# Patient Record
Sex: FEMALE | Race: OTHER
[De-identification: names, ages, dates, MRNs, and addresses within clinical notes are randomized per-mention and may not be internally consistent; named-entity substitution may affect disease eponyms.]

---

## 2017-02-12 ENCOUNTER — HOSPITAL ENCOUNTER (EMERGENCY)
Dept: HOSPITAL 62 - ER | Age: 27
Discharge: HOME | End: 2017-02-12
Payer: MEDICAID

## 2017-02-12 VITALS — SYSTOLIC BLOOD PRESSURE: 131 MMHG | DIASTOLIC BLOOD PRESSURE: 80 MMHG

## 2017-02-12 DIAGNOSIS — R51: ICD-10-CM

## 2017-02-12 DIAGNOSIS — R09.81: ICD-10-CM

## 2017-02-12 DIAGNOSIS — J06.9: Primary | ICD-10-CM

## 2017-02-12 LAB
APPEARANCE UR: CLEAR
BILIRUB UR QL STRIP: NEGATIVE
GLUCOSE UR STRIP-MCNC: NEGATIVE MG/DL
KETONES UR STRIP-MCNC: NEGATIVE MG/DL
NITRITE UR QL STRIP: NEGATIVE
PH UR STRIP: 6 [PH] (ref 5–9)
PROT UR STRIP-MCNC: 30 MG/DL
SP GR UR STRIP: 1.02
UROBILINOGEN UR-MCNC: NEGATIVE MG/DL (ref ?–2)

## 2017-02-12 PROCEDURE — 81025 URINE PREGNANCY TEST: CPT

## 2017-02-12 PROCEDURE — 99283 EMERGENCY DEPT VISIT LOW MDM: CPT

## 2017-02-12 PROCEDURE — 81001 URINALYSIS AUTO W/SCOPE: CPT

## 2017-02-12 NOTE — ER DOCUMENT REPORT
ED Medical Screen (RME)





- General


Chief Complaint: Cough


Stated Complaint: COUGH,HEADACHE,NASAL CONGESTION


Time seen by provider: 15:14


Mode of Arrival: Ambulatory


Information source: Patient


Notes: 


27-year-old female presents to ED for high blood pressure runny nose cough and 

headache and sneezing for week.  States she had a child 3 months ago and has 

not had a period since then unsure if she is pregnant or not.  States she had 

elevated blood pressure at home blood pressure is 130/84 in the emergency room.














I have greeted and performed a rapid initial assessment of this patient.  A 

comprehensive ED assessment and evaluation of the patient, analysis of test 

results and completion of medical decision making process will be conducted by 

an additional ED providers.


TRAVEL OUTSIDE OF THE [place] IN LAST 30 DAYS: No





- Related Data


Allergies/Adverse Reactions: 


 





No Known Allergies Allergy (Verified 10/08/16 09:11)


 











Past Medical History


Pulmonary Medical History: Reports: Hx Asthma





- Immunizations


Immunizations up to date: Yes


Hx Diphtheria, Pertussis, Tetanus Vaccination: Yes

## 2017-03-25 ENCOUNTER — HOSPITAL ENCOUNTER (EMERGENCY)
Dept: HOSPITAL 62 - ER | Age: 27
Discharge: HOME | End: 2017-03-25
Payer: MEDICAID

## 2017-03-25 VITALS — SYSTOLIC BLOOD PRESSURE: 133 MMHG | DIASTOLIC BLOOD PRESSURE: 87 MMHG

## 2017-03-25 DIAGNOSIS — R51: Primary | ICD-10-CM

## 2017-03-25 DIAGNOSIS — R11.0: ICD-10-CM

## 2017-03-25 DIAGNOSIS — Z3A.13: ICD-10-CM

## 2017-03-25 LAB
ALBUMIN SERPL-MCNC: 3.9 G/DL (ref 3.5–5)
ALP SERPL-CCNC: 46 U/L (ref 38–126)
ALT SERPL-CCNC: 29 U/L (ref 9–52)
ANION GAP SERPL CALC-SCNC: 11 MMOL/L (ref 5–19)
APPEARANCE UR: (no result)
AST SERPL-CCNC: 21 U/L (ref 14–36)
BASOPHILS # BLD AUTO: 0.1 10^3/UL (ref 0–0.2)
BASOPHILS NFR BLD AUTO: 0.7 % (ref 0–2)
BILIRUB DIRECT SERPL-MCNC: 0.1 MG/DL (ref 0–0.4)
BILIRUB SERPL-MCNC: 0.5 MG/DL (ref 0.2–1.3)
BILIRUB UR QL STRIP: NEGATIVE
BUN SERPL-MCNC: 10 MG/DL (ref 7–20)
CALCIUM: 10.2 MG/DL (ref 8.4–10.2)
CHLORIDE SERPL-SCNC: 103 MMOL/L (ref 98–107)
CO2 SERPL-SCNC: 26 MMOL/L (ref 22–30)
CREAT SERPL-MCNC: 0.53 MG/DL (ref 0.52–1.25)
EOSINOPHIL # BLD AUTO: 0.2 10^3/UL (ref 0–0.6)
EOSINOPHIL NFR BLD AUTO: 3.6 % (ref 0–6)
ERYTHROCYTE [DISTWIDTH] IN BLOOD BY AUTOMATED COUNT: 16.1 % (ref 11.5–14)
GLUCOSE SERPL-MCNC: 98 MG/DL (ref 75–110)
GLUCOSE UR STRIP-MCNC: NEGATIVE MG/DL
HCT VFR BLD CALC: 35.8 % (ref 36–47)
HGB BLD-MCNC: 12.2 G/DL (ref 12–15.5)
HGB HCT DIFFERENCE: 0.8
KETONES UR STRIP-MCNC: NEGATIVE MG/DL
LDH1 SERPL-CCNC: 448 U/L (ref 313–618)
LYMPHOCYTES # BLD AUTO: 2.1 10^3/UL (ref 0.5–4.7)
LYMPHOCYTES NFR BLD AUTO: 31.2 % (ref 13–45)
MCH RBC QN AUTO: 27.6 PG (ref 27–33.4)
MCHC RBC AUTO-ENTMCNC: 34 G/DL (ref 32–36)
MCV RBC AUTO: 81 FL (ref 80–97)
MONOCYTES # BLD AUTO: 0.5 10^3/UL (ref 0.1–1.4)
MONOCYTES NFR BLD AUTO: 7.6 % (ref 3–13)
NEUTROPHILS # BLD AUTO: 3.9 10^3/UL (ref 1.7–8.2)
NEUTS SEG NFR BLD AUTO: 56.9 % (ref 42–78)
NITRITE UR QL STRIP: NEGATIVE
PH UR STRIP: 6 [PH] (ref 5–9)
POTASSIUM SERPL-SCNC: 4.2 MMOL/L (ref 3.6–5)
PROT SERPL-MCNC: 7.4 G/DL (ref 6.3–8.2)
PROT UR STRIP-MCNC: 30 MG/DL
RBC # BLD AUTO: 4.41 10^6/UL (ref 3.72–5.28)
SODIUM SERPL-SCNC: 140.1 MMOL/L (ref 137–145)
SP GR UR STRIP: 1.03
URATE SERPL-MCNC: 3.8 MG/DL (ref 2.5–6.2)
UROBILINOGEN UR-MCNC: NEGATIVE MG/DL (ref ?–2)
WBC # BLD AUTO: 6.8 10^3/UL (ref 4–10.5)

## 2017-03-25 PROCEDURE — 99284 EMERGENCY DEPT VISIT MOD MDM: CPT

## 2017-03-25 PROCEDURE — 83615 LACTATE (LD) (LDH) ENZYME: CPT

## 2017-03-25 PROCEDURE — 80053 COMPREHEN METABOLIC PANEL: CPT

## 2017-03-25 PROCEDURE — 84702 CHORIONIC GONADOTROPIN TEST: CPT

## 2017-03-25 PROCEDURE — 84550 ASSAY OF BLOOD/URIC ACID: CPT

## 2017-03-25 PROCEDURE — 85025 COMPLETE CBC W/AUTO DIFF WBC: CPT

## 2017-03-25 PROCEDURE — 81001 URINALYSIS AUTO W/SCOPE: CPT

## 2017-03-25 PROCEDURE — 36415 COLL VENOUS BLD VENIPUNCTURE: CPT

## 2017-03-25 NOTE — ER DOCUMENT REPORT
ED General





- General


Chief Complaint: Headache


Stated Complaint: NAUSEA


Mode of Arrival: Ambulatory


TRAVEL OUTSIDE OF THE [place] IN LAST 30 DAYS: No





- HPI


Patient complains to provider of: frontal headache


Notes: 


Patient's coming in stating that she's not had a menstrual cycle for the last 3 

months concerned she is pregnant has a frontal headache which is similar to 

headaches in the past with no relief with Tylenol home.  Patient also is 

concerned she is pregnant and does not know how far along she is.  Patient also 

because complain of some mild nausea denies any fevers chills diarrhea 

abdominal trauma abdominal pain 





- Related Data


Allergies/Adverse Reactions: 


 





No Known Allergies Allergy (Verified 17 11:54)


 











Past Medical History





- General


Information source: Patient





- Social History


Smoking Status: Never Smoker


Chew tobacco use (# tins/day): No


Frequency of alcohol use: None


Drug Abuse: None


Family History: Reviewed & Not Pertinent


Pulmonary Medical History: Reports: Hx Asthma


Renal/ Medical History: Denies: Hx Peritoneal Dialysis





- Immunizations


Immunizations up to date: Yes


Hx Diphtheria, Pertussis, Tetanus Vaccination: Yes





Review of Systems





- Review of Systems


Constitutional: No symptoms reported


EENT: No symptoms reported


Cardiovascular: No symptoms reported


Respiratory: No symptoms reported


Gastrointestinal: Nausea


Genitourinary: No symptoms reported


Female Genitourinary: No symptoms reported


Musculoskeletal: No symptoms reported


Skin: No symptoms reported


Hematologic/Lymphatic: No symptoms reported


Neurological/Psychological: Headaches


-: Yes All other systems reviewed and negative





Physical Exam





- Vital signs


Vitals: 





 











Temp Pulse Resp BP Pulse Ox


 


 98.3 F   106 H  24 H  155/99 H  99 


 


 17 11:51  17 11:51  17 11:51  17 11:51  17 11:51











Interpretation: Normal





- General


General appearance: Appears well, Alert





- HEENT


Head: Normocephalic, Atraumatic


Eyes: Normal


Pupils: PERRL





- Respiratory


Respiratory status: No respiratory distress


Chest status: Nontender


Breath sounds: Normal


Chest palpation: Normal





- Cardiovascular


Rhythm: Regular


Heart sounds: Normal auscultation


Murmur: No





- Abdominal


Inspection: Normal


Distension: No distension


Bowel sounds: Normal


Tenderness: Nontender


Organomegaly: No organomegaly





- Back


Back: Normal, Nontender





- Extremities


General upper extremity: Normal inspection, Nontender, Normal color, Normal ROM

, Normal temperature


General lower extremity: Normal inspection, Nontender, Normal color, Normal ROM

, Normal temperature, Normal weight bearing.  No: Elaine's sign





- Neurological


Neuro grossly intact: Yes


Cognition: Normal


Orientation: AAOx4


[place] Coma Scale Eye Opening: Spontaneous


Lance Coma Scale Verbal: Oriented


[place] Coma Scale Motor: Obeys Commands


[place] Coma Scale Total: 15


Speech: Normal


Motor strength normal: LUE, RUE, LLE, RLE


Sensory: Normal





- Psychological


Associated symptoms: Normal affect, Normal mood





- Skin


Skin Temperature: Warm


Skin Moisture: Dry


Skin Color: Normal





Course





- Re-evaluation


Re-evalutation: 





17 13:38


Patient laboratory showed elevation in the patient's beta-hCG.  Bedside 

ultrasound does confirm a IUP with a biparietal measurement of approximately 13 

weeks.  Patient's fetal heart tones was measured at 160-158.  Otherwise no 

other signs of concerning etiology.  Patient was encouraged to take Reglan for 

for nausea and prenatal vitamins.  Patient was encouraged follow-up with her OB/

GYN.  Patient will be discharged home





- Vital Signs


Vital signs: 





 











Temp Pulse Resp BP Pulse Ox


 


 98.3 F   106 H  24 H  155/99 H  99 


 


 17 11:51  17 11:51  17 11:51  17 11:51  17 11:51














- Laboratory


Result Diagrams: 


 17 12:00





 17 12:00


Laboratory results interpreted by me: 





 











  17





  12:00 12:00 12:05


 


Hct  35.8 L  


 


RDW  16.1 H  


 


Beta HCG, Quant   06618.00 H 


 


Urine Protein    30 H


 


Ur Leukocyte Esterase    SMALL H


 


Urine Ascorbic Acid    40 H














Discharge





- Discharge


Clinical Impression: 


 Nausea





Pregnancy


Qualifiers:


 Weeks of gestation: 13 weeks Qualified Code(s): Z3A.13 - 13 weeks gestation of 

pregnancy





Headache


Qualifiers:


 Headache type: unspecified Headache chronicity pattern: unspecified pattern 

Intractability: not intractable Qualified Code(s): R51 - Headache





Condition: Good


Disposition: HOME, SELF-CARE


Instructions:  Headache ([facility]), Reglan ([facility]), Pregnancy ([facility])


Additional Instructions: 


Take medication as prescribed.  Please continue to take your prenatal vitamins.





You have been seen for vomiting during pregnancy.  You should continue to drink 

plenty of water and consider taking a solution such as Pedialyte if your having 

difficulty eating food.  Please return if you become unable to drink any fluids 

for more than 12 hours, urinate less than twice a day, pass out, or have any 

other symptoms that are concerning to you.





For nausea and vomiting during pregnancy I recomment:


Start with 10-12.5 mg of pyridoxine (vitamin B6) three times a day for 2 days. 

If not fully effective,


Increase to 12.5 mg of pyridoxine four times a day for 2 days. If not fully 

effective,


Increase to 25 mg of pyridoxine three times a day for 2 days. If not fully 

effective,


Continue 25 mg pyridoxine 3 times a day, and add 12.5 mg of doxylamine before 

bedtime each day for 2 days. If not fully effective,


Continue 25 mg pyridoxine 3 times a day, and take 12.5 mg of doxylamine twice a 

day. If not fully effective,


Continue 25 mg pyridoxine 3 times a day, and take 12.5 mg of doxylamine three 

times a day. If not fully effective,


Continue 25 mg pyridoxine 3 times a day, and 12.5 mg of doxylamine 3 times a day

, while adding Emetrol, one to two tablespoons (15-30 cc) taken once or twice a 

day as needed. (Emetrol is an over-the-counter mixture of sugar syrups and 

phosphoric acid [phosphorylated carbohydrate solution]) that acts by soothing 

the actual wall of the gastrointestinal tract). If not fully effective,


Consult with your doctor.


Prescriptions: 


Metoclopramide HCl [Reglan] 5 mg PO Q6 #20 tablet


Pnv No.122/Iron/Folic Acid [Prenatal Multi Tablet] 1 each PO DAILY #30 tablet


Referrals: 


KEYSHAWN MALIK MD [Primary Care Provider] - Follow up in 3-5 days

## 2017-03-25 NOTE — ER DOCUMENT REPORT
ED Medical Screen (RME)





- General


Stated Complaint: NAUSEA


Mode of Arrival: Ambulatory


Information source: Patient


Notes: 


pt presents with HA for the past 2 days.  Patient reports she is pregnant.  She 

reports with last pregnancy she was preeclamptic.  She also reports nausea and 

vomiting.  LMP January. 











I have greeted and performed a rapid initial assessment of this patient.  A 

comprehensive ED assessment and evaluation of the patient, analysis of test 

results and completion of the medical decision making process will be conducted 

by additional ED providers.


TRAVEL OUTSIDE OF THE [place] IN LAST 30 DAYS: No





- Related Data


Allergies/Adverse Reactions: 


 





No Known Allergies Allergy (Verified 17 15:18)


 











Past Medical History


Pulmonary Medical History: Reports: Hx Asthma


Renal/ Medical History: Denies: Hx Peritoneal Dialysis





- Immunizations


Immunizations up to date: Yes


Hx Diphtheria, Pertussis, Tetanus Vaccination: Yes

## 2017-04-11 ENCOUNTER — HOSPITAL ENCOUNTER (EMERGENCY)
Dept: HOSPITAL 62 - ER | Age: 27
Discharge: HOME | End: 2017-04-11
Payer: SELF-PAY

## 2017-04-11 VITALS — SYSTOLIC BLOOD PRESSURE: 130 MMHG | DIASTOLIC BLOOD PRESSURE: 95 MMHG

## 2017-04-11 DIAGNOSIS — R51: ICD-10-CM

## 2017-04-11 DIAGNOSIS — Z3A.16: ICD-10-CM

## 2017-04-11 DIAGNOSIS — R42: ICD-10-CM

## 2017-04-11 DIAGNOSIS — R03.0: Primary | ICD-10-CM

## 2017-04-11 LAB
ALBUMIN SERPL-MCNC: 4.2 G/DL (ref 3.5–5)
ALP SERPL-CCNC: 52 U/L (ref 38–126)
ALT SERPL-CCNC: 29 U/L (ref 9–52)
ANION GAP SERPL CALC-SCNC: 14 MMOL/L (ref 5–19)
APPEARANCE UR: (no result)
AST SERPL-CCNC: 28 U/L (ref 14–36)
BASOPHILS # BLD AUTO: 0 10^3/UL (ref 0–0.2)
BASOPHILS NFR BLD AUTO: 0.4 % (ref 0–2)
BILIRUB DIRECT SERPL-MCNC: 0.2 MG/DL (ref 0–0.4)
BILIRUB SERPL-MCNC: 0.5 MG/DL (ref 0.2–1.3)
BILIRUB UR QL STRIP: NEGATIVE
BUN SERPL-MCNC: 13 MG/DL (ref 7–20)
CALCIUM: 10.1 MG/DL (ref 8.4–10.2)
CHLORIDE SERPL-SCNC: 103 MMOL/L (ref 98–107)
CO2 SERPL-SCNC: 24 MMOL/L (ref 22–30)
CREAT SERPL-MCNC: 0.52 MG/DL (ref 0.52–1.25)
EOSINOPHIL # BLD AUTO: 0.5 10^3/UL (ref 0–0.6)
EOSINOPHIL NFR BLD AUTO: 6.2 % (ref 0–6)
ERYTHROCYTE [DISTWIDTH] IN BLOOD BY AUTOMATED COUNT: 15.9 % (ref 11.5–14)
GLUCOSE SERPL-MCNC: 90 MG/DL (ref 75–110)
GLUCOSE UR STRIP-MCNC: NEGATIVE MG/DL
HCT VFR BLD CALC: 36.7 % (ref 36–47)
HGB BLD-MCNC: 12.3 G/DL (ref 12–15.5)
HGB HCT DIFFERENCE: 0.2
KETONES UR STRIP-MCNC: NEGATIVE MG/DL
LYMPHOCYTES # BLD AUTO: 2.5 10^3/UL (ref 0.5–4.7)
LYMPHOCYTES NFR BLD AUTO: 28.3 % (ref 13–45)
MCH RBC QN AUTO: 27.5 PG (ref 27–33.4)
MCHC RBC AUTO-ENTMCNC: 33.4 G/DL (ref 32–36)
MCV RBC AUTO: 82 FL (ref 80–97)
MONOCYTES # BLD AUTO: 0.7 10^3/UL (ref 0.1–1.4)
MONOCYTES NFR BLD AUTO: 8.3 % (ref 3–13)
NEUTROPHILS # BLD AUTO: 5 10^3/UL (ref 1.7–8.2)
NEUTS SEG NFR BLD AUTO: 56.8 % (ref 42–78)
NITRITE UR QL STRIP: NEGATIVE
PH UR STRIP: 6 [PH] (ref 5–9)
POTASSIUM SERPL-SCNC: 4.5 MMOL/L (ref 3.6–5)
PROT SERPL-MCNC: 7.8 G/DL (ref 6.3–8.2)
PROT UR STRIP-MCNC: 30 MG/DL
RBC # BLD AUTO: 4.46 10^6/UL (ref 3.72–5.28)
SODIUM SERPL-SCNC: 140.5 MMOL/L (ref 137–145)
SP GR UR STRIP: 1.03
UROBILINOGEN UR-MCNC: NEGATIVE MG/DL (ref ?–2)
WBC # BLD AUTO: 8.8 10^3/UL (ref 4–10.5)

## 2017-04-11 PROCEDURE — 85025 COMPLETE CBC W/AUTO DIFF WBC: CPT

## 2017-04-11 PROCEDURE — 36415 COLL VENOUS BLD VENIPUNCTURE: CPT

## 2017-04-11 PROCEDURE — 99284 EMERGENCY DEPT VISIT MOD MDM: CPT

## 2017-04-11 PROCEDURE — 80053 COMPREHEN METABOLIC PANEL: CPT

## 2017-04-11 PROCEDURE — 81001 URINALYSIS AUTO W/SCOPE: CPT

## 2017-04-11 NOTE — ER DOCUMENT REPORT
ED Medical Screen (RME)





- General


Chief Complaint: High Blood Pressure


Stated Complaint: HEADACHE,LIGHTHEADED


Notes: 


This 27-year-old female who is 16 weeks pregnant is sent from the health 

department for monitoring for elevated blood pressure.


She did have preeclampsia with her first pregnancy and was delivered at 29 

weeks.  Her most recent pregnancy her blood pressure was quite elevated, but 

she reports that they never started treatment during that pregnancy.


She has no symptoms associated with this pregnancy other than occasional nausea.





I have greeted and performed a rapid initial assessment of this patient.  A 

comprehensive ED assessment and evaluation of the patient, analysis of test 

results and completion of the medical decision making process will be conducted 

by additional ED providers.


TRAVEL OUTSIDE OF THE [place] IN LAST 30 DAYS: No





- Related Data


Allergies/Adverse Reactions: 


 





No Known Allergies Allergy (Verified 04/11/17 17:29)


 











Past Medical History


Pulmonary Medical History: Reports: Hx Asthma


Renal/ Medical History: Denies: Hx Peritoneal Dialysis





- Immunizations


Immunizations up to date: Yes


Hx Diphtheria, Pertussis, Tetanus Vaccination: Yes





Physical Exam





- Vital signs


Vitals: 





 











Temp Pulse Resp BP Pulse Ox


 


 98.3 F   95   20   147/102 H  100 


 


 04/11/17 17:32  04/11/17 17:32  04/11/17 17:32  04/11/17 17:32  04/11/17 17:32














Course





- Vital Signs


Vital signs: 





 











Temp Pulse Resp BP Pulse Ox


 


 98.3 F   95   20   147/102 H  100 


 


 04/11/17 17:32  04/11/17 17:32  04/11/17 17:32  04/11/17 17:32  04/11/17 17:32

## 2017-04-11 NOTE — ER DOCUMENT REPORT
ED General





- General


Chief Complaint: High Blood Pressure


Stated Complaint: HEADACHE,LIGHTHEADED


Notes: 


Patient is a 27-year-old female,  at 16 weeks gestation by first trimester 

ultrasound, that comes emergency department for chief complaint of being sent 

from the health department for elevated blood pressures.  Patient denies any 

current symptoms, states she feels fine now, states she did have a headache 

earlier but this resolved.  She denies vomiting, abdominal pain, vaginal 

bleeding, or any other current symptoms.  He states she has a history of 

preeclampsia with her second pregnancy, states she was induced at 29 weeks. 


TRAVEL OUTSIDE OF THE [place] IN LAST 30 DAYS: No





- Related Data


Allergies/Adverse Reactions: 


 





No Known Allergies Allergy (Verified 17 17:29)


 











Past Medical History





- General


Information source: Patient





- Social History


Smoking Status: Never Smoker


Frequency of alcohol use: None


Drug Abuse: None


Lives with: Family


Family History: Reviewed & Not Pertinent


Patient has suicidal ideation: No


Patient has homicidal ideation: No


Pulmonary Medical History: Reports: Hx Asthma


Renal/ Medical History: Denies: Hx Peritoneal Dialysis





- Immunizations


Immunizations up to date: Yes


Hx Diphtheria, Pertussis, Tetanus Vaccination: Yes





Review of Systems





- Review of Systems


Constitutional: No symptoms reported


EENT: No symptoms reported


Cardiovascular: See HPI


Respiratory: No symptoms reported


Gastrointestinal: No symptoms reported


Genitourinary: No symptoms reported


Female Genitourinary: See HPI


Musculoskeletal: No symptoms reported


Skin: No symptoms reported


Hematologic/Lymphatic: No symptoms reported


Neurological/Psychological: No symptoms reported





Physical Exam





- Vital signs


Vitals: 


 











Temp Pulse Resp BP Pulse Ox


 


 98.3 F   95   20   147/102 H  100 


 


 17 17:32  17 17:32  17 17:32  17 17:32  17 17:32











Interpretation: Normal





- General


General appearance: Appears well, Alert, Other - Patient appears excited, talks 

rapidly, appears slightly nervous


In distress: None





- HEENT


Head: Normocephalic, Atraumatic


Eyes: Normal


Pupils: PERRL





- Respiratory


Respiratory status: No respiratory distress


Chest status: Nontender


Breath sounds: Normal


Chest palpation: Normal





- Cardiovascular


Rhythm: Regular.  No: Tachycardia


Heart sounds: Normal auscultation, S1 appreciated, S2 appreciated


Murmur: No





- Abdominal


Inspection: Normal


Distension: No distension


Bowel sounds: Normal


Tenderness: Nontender.  No: Tender


Organomegaly: No organomegaly





- Back


Back: Normal, Nontender





- Extremities


General upper extremity: Normal inspection, Nontender, Normal color, Normal ROM

, Normal temperature


General lower extremity: Normal inspection, Nontender, Normal color, Normal ROM

, Normal temperature, Normal weight bearing.  No: Elaine's sign





- Neurological


Neuro grossly intact: Yes


Cognition: Normal


Orientation: AAOx4


[place] Coma Scale Eye Opening: Spontaneous


[place] Coma Scale Verbal: Oriented


[place] Coma Scale Motor: Obeys Commands


Lance Coma Scale Total: 15


Speech: Normal


Motor strength normal: LUE, RUE, LLE, RLE


Sensory: Normal





- Psychological


Associated symptoms: Normal affect, Normal mood, Anxious - Slightly





- Skin


Skin Temperature: Warm


Skin Moisture: Dry


Skin Color: Normal





Course





- Re-evaluation


Re-evalutation: 


Patient initially very nervous and excited, blood pressure slightly elevated at 

nonspecific, this was repeated when she was calmer and is in the 130s over 80s, 

patient very well appearing, soft abdomen, unremarkable vital signs, 

nonspecific workup which is basically unchanged from the previous 2 workups 

that she has had during this pregnancy at this department.  





Patient is less than 20 weeks gestation, per up-to-date guidelines they do not 

recommend treatment of elevated blood pressure that is averaging below 150s 

over 100s even in patients greater than 20 weeks who have preeclampsia.  I 

discussed this with patient.  I discussed her workup, patient has follow-up are 

scheduled for OB/GYN, discussed return precautions.  Patient states 

understanding, satisfaction, agreement.





- Vital Signs


Vital signs: 


 











Temp Pulse Resp BP Pulse Ox


 


 99.1 F   103 H  16   130/95 H  98 


 


 17 20:45  17 20:45  17 20:45  17 20:45  17 20:45














- Laboratory


Result Diagrams: 


 17 18:05





 17 18:05


Laboratory results interpreted by me: 


 











  17





  18:05 18:10


 


RDW  15.9 H 


 


Eosinophils %  6.2 H 


 


Urine Protein   30 H














Discharge





- Discharge


Clinical Impression: 


 Elevated blood pressure reading





Condition: Stable


Disposition: HOME, SELF-CARE


Additional Instructions: 


Your blood pressure is borderline high, I suspect this is not specific to 

pregnancy and you likely have  pre-existing borderline hypertension.  This 

needs to be monitored routinely by primary care/OB/GYN.  Follow-up with them 

for additional management. However please return immediately if she develops 

any concerning symptoms including severe headache, severe abdominal pain, 

vaginal bleeding, or any other concerning symptoms.

## 2017-04-25 ENCOUNTER — HOSPITAL ENCOUNTER (EMERGENCY)
Dept: HOSPITAL 62 - ER | Age: 27
LOS: 1 days | Discharge: LEFT BEFORE DISCHARGE | End: 2017-04-26
Payer: SELF-PAY

## 2017-04-25 VITALS — SYSTOLIC BLOOD PRESSURE: 128 MMHG | DIASTOLIC BLOOD PRESSURE: 88 MMHG

## 2017-04-25 DIAGNOSIS — Z53.21: Primary | ICD-10-CM

## 2017-05-31 ENCOUNTER — HOSPITAL ENCOUNTER (OUTPATIENT)
Dept: HOSPITAL 62 - RAD | Age: 27
End: 2017-05-31
Attending: NURSE PRACTITIONER
Payer: MEDICAID

## 2017-05-31 DIAGNOSIS — Z34.82: Primary | ICD-10-CM

## 2017-05-31 PROCEDURE — 76805 OB US >/= 14 WKS SNGL FETUS: CPT

## 2017-05-31 NOTE — RADIOLOGY REPORT (SQ)
EXAM DESCRIPTION:  U/S OB 14+ TRNABD 1GES W/O DOP



COMPLETED DATE/TIME:  5/31/2017 4:28 pm



REASON FOR STUDY:  PREGNANCY SECOND TRIMESTER Z34.82  ENCOUNTER FOR SUPRVSN OF NORMAL PREGNANCY, SECO
ND TRI



COMPARISON:  None.



TECHNIQUE:  Static and Dynamic grayscale imaging performed of gravid uterus using transabdominal appr
oach.  Additional selected color Doppler and spectral images recorded.  All stored on PACS.



LIMITATIONS:  None.



FINDINGS:  EGA: 22 week 4 day.

ADEN: 9/30/2017.

EFW: 508 g.

PERCENTILE: 24%.

ORTIZ: Adequate amount.

PLACENTA: Anterior.

FETAL PRESENTATION: Breech.

FETAL ANATOMY:

FETAL HEART RATE: 173 beats per minute.

FOUR CHAMBER HEART: Visualized.

THREE VESSEL CORD: Yes.

CORD INSERTION: Visualized.

KIDNEYS AND BLADDER: Visualized. Appear normal.

STOMACH: Visualized. Appears normal.

SPINE: Normal as visualized.

BRAIN AND LATERAL VENTRICLES: Visualized. Appear normal.

OTHER: No other significant finding.

MATERNAL ADNEXA: Maternal ovaries not visualized.

OTHER: No other significant finding.



IMPRESSION:  LIVING INTRAUTERINE PREGNANCY.

ESTIMATED GESTATIONAL AGE 22 WEEK 4 DAY.

NO VISUALIZED ANOMALIES.

Trimester of pregnancy: Second trimester - 13 weeks 1 day to 27 weeks 6 days.



TECHNICAL DOCUMENTATION:  JOB ID:  [id number]

 2011 [email or web address]- All Rights Reserved

## 2017-07-09 ENCOUNTER — HOSPITAL ENCOUNTER (OUTPATIENT)
Dept: HOSPITAL 62 - LC | Age: 27
Setting detail: OBSERVATION
LOS: 1 days | Discharge: HOME | End: 2017-07-10
Attending: OBSTETRICS & GYNECOLOGY | Admitting: OBSTETRICS & GYNECOLOGY
Payer: MEDICAID

## 2017-07-09 DIAGNOSIS — O98.413: ICD-10-CM

## 2017-07-09 DIAGNOSIS — E66.01: ICD-10-CM

## 2017-07-09 DIAGNOSIS — Z86.59: ICD-10-CM

## 2017-07-09 DIAGNOSIS — B19.20: ICD-10-CM

## 2017-07-09 DIAGNOSIS — B18.2: ICD-10-CM

## 2017-07-09 DIAGNOSIS — R00.0: ICD-10-CM

## 2017-07-09 DIAGNOSIS — E83.42: ICD-10-CM

## 2017-07-09 DIAGNOSIS — Z82.49: ICD-10-CM

## 2017-07-09 DIAGNOSIS — O26.893: ICD-10-CM

## 2017-07-09 DIAGNOSIS — O13.3: Primary | ICD-10-CM

## 2017-07-09 DIAGNOSIS — O09.33: ICD-10-CM

## 2017-07-09 DIAGNOSIS — O99.213: ICD-10-CM

## 2017-07-09 DIAGNOSIS — I08.1: ICD-10-CM

## 2017-07-09 DIAGNOSIS — Z3A.28: ICD-10-CM

## 2017-07-09 DIAGNOSIS — Z87.09: ICD-10-CM

## 2017-07-09 LAB
ALBUMIN SERPL-MCNC: 3.5 G/DL (ref 3.5–5)
ALP SERPL-CCNC: 67 U/L (ref 38–126)
ALT SERPL-CCNC: 29 U/L (ref 9–52)
ANION GAP SERPL CALC-SCNC: 11 MMOL/L (ref 5–19)
APPEARANCE UR: (no result)
AST SERPL-CCNC: 23 U/L (ref 14–36)
BARBITURATES UR QL SCN: NEGATIVE
BASOPHILS # BLD AUTO: 0.1 10^3/UL (ref 0–0.2)
BASOPHILS NFR BLD AUTO: 0.6 % (ref 0–2)
BILIRUB DIRECT SERPL-MCNC: 0.3 MG/DL (ref 0–0.4)
BILIRUB SERPL-MCNC: 0.4 MG/DL (ref 0.2–1.3)
BILIRUB UR QL STRIP: NEGATIVE
BUN SERPL-MCNC: 9 MG/DL (ref 7–20)
CALCIUM: 9.7 MG/DL (ref 8.4–10.2)
CHLORIDE SERPL-SCNC: 107 MMOL/L (ref 98–107)
CO2 SERPL-SCNC: 21 MMOL/L (ref 22–30)
CREAT SERPL-MCNC: 0.42 MG/DL (ref 0.52–1.25)
CREAT UR-MCNC: 106.6 MG/DL (ref 16–327)
EOSINOPHIL # BLD AUTO: 0.2 10^3/UL (ref 0–0.6)
EOSINOPHIL NFR BLD AUTO: 1.9 % (ref 0–6)
ERYTHROCYTE [DISTWIDTH] IN BLOOD BY AUTOMATED COUNT: 14.8 % (ref 11.5–14)
GLUCOSE SERPL-MCNC: 90 MG/DL (ref 75–110)
GLUCOSE UR STRIP-MCNC: NEGATIVE MG/DL
HCT VFR BLD CALC: 36.2 % (ref 36–47)
HGB BLD-MCNC: 12.1 G/DL (ref 12–15.5)
HGB HCT DIFFERENCE: 0.1
KETONES UR STRIP-MCNC: NEGATIVE MG/DL
LDH1 SERPL-CCNC: 471 U/L (ref 313–618)
LYMPHOCYTES # BLD AUTO: 2.5 10^3/UL (ref 0.5–4.7)
LYMPHOCYTES NFR BLD AUTO: 26.7 % (ref 13–45)
MCH RBC QN AUTO: 28.2 PG (ref 27–33.4)
MCHC RBC AUTO-ENTMCNC: 33.5 G/DL (ref 32–36)
MCV RBC AUTO: 84 FL (ref 80–97)
METHADONE UR QL SCN: NEGATIVE
MONOCYTES # BLD AUTO: 0.8 10^3/UL (ref 0.1–1.4)
MONOCYTES NFR BLD AUTO: 8.4 % (ref 3–13)
NEUTROPHILS # BLD AUTO: 5.9 10^3/UL (ref 1.7–8.2)
NEUTS SEG NFR BLD AUTO: 62.4 % (ref 42–78)
NITRITE UR QL STRIP: NEGATIVE
PCP UR QL SCN: NEGATIVE
PH UR STRIP: 6 [PH] (ref 5–9)
POTASSIUM SERPL-SCNC: 4.8 MMOL/L (ref 3.6–5)
PROT SERPL-MCNC: 7.2 G/DL (ref 6.3–8.2)
PROT UR STRIP-MCNC: 37.6 MG/DL (ref ?–12)
PROT UR STRIP-MCNC: NEGATIVE MG/DL
RBC # BLD AUTO: 4.3 10^6/UL (ref 3.72–5.28)
SODIUM SERPL-SCNC: 138.5 MMOL/L (ref 137–145)
SP GR UR STRIP: 1.02
URATE SERPL-MCNC: 3.6 MG/DL (ref 2.5–6.2)
URINE OPIATES LOW: NEGATIVE
UROBILINOGEN UR-MCNC: NEGATIVE MG/DL (ref ?–2)
WBC # BLD AUTO: 9.5 10^3/UL (ref 4–10.5)

## 2017-07-09 PROCEDURE — 86850 RBC ANTIBODY SCREEN: CPT

## 2017-07-09 PROCEDURE — 84439 ASSAY OF FREE THYROXINE: CPT

## 2017-07-09 PROCEDURE — 93005 ELECTROCARDIOGRAM TRACING: CPT

## 2017-07-09 PROCEDURE — 84443 ASSAY THYROID STIM HORMONE: CPT

## 2017-07-09 PROCEDURE — 85025 COMPLETE CBC W/AUTO DIFF WBC: CPT

## 2017-07-09 PROCEDURE — 84550 ASSAY OF BLOOD/URIC ACID: CPT

## 2017-07-09 PROCEDURE — 94760 N-INVAS EAR/PLS OXIMETRY 1: CPT

## 2017-07-09 PROCEDURE — 83735 ASSAY OF MAGNESIUM: CPT

## 2017-07-09 PROCEDURE — 93306 TTE W/DOPPLER COMPLETE: CPT

## 2017-07-09 PROCEDURE — 36415 COLL VENOUS BLD VENIPUNCTURE: CPT

## 2017-07-09 PROCEDURE — G0378 HOSPITAL OBSERVATION PER HR: HCPCS

## 2017-07-09 PROCEDURE — 82570 ASSAY OF URINE CREATININE: CPT

## 2017-07-09 PROCEDURE — 93010 ELECTROCARDIOGRAM REPORT: CPT

## 2017-07-09 PROCEDURE — 83615 LACTATE (LD) (LDH) ENZYME: CPT

## 2017-07-09 PROCEDURE — 81001 URINALYSIS AUTO W/SCOPE: CPT

## 2017-07-09 PROCEDURE — 86901 BLOOD TYPING SEROLOGIC RH(D): CPT

## 2017-07-09 PROCEDURE — 84481 FREE ASSAY (FT-3): CPT

## 2017-07-09 PROCEDURE — 86900 BLOOD TYPING SEROLOGIC ABO: CPT

## 2017-07-09 PROCEDURE — G0379 DIRECT REFER HOSPITAL OBSERV: HCPCS

## 2017-07-09 PROCEDURE — 84156 ASSAY OF PROTEIN URINE: CPT

## 2017-07-09 PROCEDURE — 80307 DRUG TEST PRSMV CHEM ANLYZR: CPT

## 2017-07-09 PROCEDURE — 80053 COMPREHEN METABOLIC PANEL: CPT

## 2017-07-09 PROCEDURE — 76815 OB US LIMITED FETUS(S): CPT

## 2017-07-09 RX ADMIN — BETAMETHASONE SODIUM PHOSPHATE AND BETAMETHASONE ACETATE SCH MG: 3; 3 INJECTION, SUSPENSION INTRA-ARTICULAR; INTRALESIONAL; INTRAMUSCULAR at 16:11

## 2017-07-09 NOTE — RADIOLOGY REPORT (SQ)
EXAM DESCRIPTION:  U/S OB LIMITED



COMPLETED DATE/TIME:  7/9/2017 5:38 pm



REASON FOR STUDY:  29wk IUP, poor dates, HTN, GROWTH



COMPARISON:  None.



TECHNIQUE:  Limited transabdominal grayscale ultrasound for evaluation of specific requested obstetri
parker parameters.



LIMITATIONS:  None.



FINDINGS:  Intrauterine pregnancy measuring 29 weeks 2 days by composite ultrasound scoring, consiste
nt with dates.  EFW 1394 g. CERVICAL LENGTH:  Not visualized

ORTIZ: 15.2 cm.

FHR: Not documented.

PRESENTATION: Cephalic.

OTHER: Anterior placenta.



IMPRESSION:  LIMITED OBSTETRICAL ULTRASOUND WITH MEASURED PARAMETERS DELINEATED ABOVE.

Trimester of pregnancy: Third trimester - 28 weeks to delivery.



TECHNICAL DOCUMENTATION:  JOB ID:  [id number]

 2011 [email or web address]- All Rights Reserved

## 2017-07-10 VITALS — DIASTOLIC BLOOD PRESSURE: 87 MMHG | SYSTOLIC BLOOD PRESSURE: 134 MMHG

## 2017-07-10 LAB
PROT UR STRIP-MCNC: 10 MG/DL (ref ?–12)
T3FREE SERPL-MCNC: 2.4 PG/ML (ref 2.77–5.27)
TSH SERPL-ACNC: 0.59 UIU/ML (ref 0.47–4.68)

## 2017-07-10 RX ADMIN — BETAMETHASONE SODIUM PHOSPHATE AND BETAMETHASONE ACETATE SCH MG: 3; 3 INJECTION, SUSPENSION INTRA-ARTICULAR; INTRALESIONAL; INTRAMUSCULAR at 17:06

## 2017-07-10 NOTE — PDOC PROGRESS REPORT
Subjective


Subjective:: 


Pt reports feeling well, onlt occ "heart palpatations" when she gets nervous, 

no pain or SOB


No ctx's, VB or LOF, reports good fetal movement





Physical Exam





- Physical Exam


Vital Signs: 


 











Temp Pulse Resp BP Pulse Ox


 


 97.7 F   112 H  16   125/74   98 


 


 07/10/17 08:04  07/10/17 08:04  07/10/17 08:04  07/10/17 08:04  07/10/17 08:04








 Intake & Output











 07/09/17 07/10/17 07/11/17





 06:59 06:59 06:59


 


Weight  111.35 kg 











General appearance: PRESENT: no acute distress, well-developed





Result


Laboratory Results: 


 





 17 16:03 





 17 15:15 





 











  17





  14:33 15:15 15:15


 


WBC   


 


RBC   


 


Hgb   


 


Hct   


 


MCV   


 


MCH   


 


MCHC   


 


RDW   


 


Plt Count   


 


Seg Neutrophils %   


 


Lymphocytes %   


 


Monocytes %   


 


Eosinophils %   


 


Basophils %   


 


Absolute Neutrophils   


 


Absolute Lymphocytes   


 


Absolute Monocytes   


 


Absolute Eosinophils   


 


Absolute Basophils   


 


Sodium   138.5 


 


Potassium   4.8 


 


Chloride   107 


 


Carbon Dioxide   21 L 


 


Anion Gap   11 


 


BUN   9 


 


Creatinine   0.42 L 


 


Est GFR ( Amer)   > 60 


 


Est GFR (Non-Af Amer)   > 60 


 


Glucose   90 


 


Uric Acid   3.6 


 


Calcium   9.7 


 


Total Bilirubin   0.4 


 


AST   23 


 


ALT   29 


 


Alkaline Phosphatase   67 


 


Total Protein   7.2 


 


Albumin   3.5 


 


Urine Color  YELLOW  


 


Urine Appearance  SLIGHTLY-CLOUDY  


 


Urine pH  6.0  


 


Ur Specific Gravity  1.016  


 


Urine Protein  NEGATIVE  


 


Urine Glucose (UA)  NEGATIVE  


 


Urine Ketones  NEGATIVE  


 


Urine Blood  NEGATIVE  


 


Urine Nitrite  NEGATIVE  


 


Ur Leukocyte Esterase  NEGATIVE  


 


Urine WBC (Auto)  2  


 


Urine RBC (Auto)  3  


 


Blood Type    A POSITIVE


 


Antibody Screen    NEGATIVE














  17





  16:03


 


WBC  9.5


 


RBC  4.30


 


Hgb  12.1


 


Hct  36.2


 


MCV  84


 


MCH  28.2


 


MCHC  33.5


 


RDW  14.8 H


 


Plt Count  276


 


Seg Neutrophils %  62.4


 


Lymphocytes %  26.7


 


Monocytes %  8.4


 


Eosinophils %  1.9


 


Basophils %  0.6


 


Absolute Neutrophils  5.9


 


Absolute Lymphocytes  2.5


 


Absolute Monocytes  0.8


 


Absolute Eosinophils  0.2


 


Absolute Basophils  0.1


 


Sodium 


 


Potassium 


 


Chloride 


 


Carbon Dioxide 


 


Anion Gap 


 


BUN 


 


Creatinine 


 


Est GFR ( Amer) 


 


Est GFR (Non-Af Amer) 


 


Glucose 


 


Uric Acid 


 


Calcium 


 


Total Bilirubin 


 


AST 


 


ALT 


 


Alkaline Phosphatase 


 


Total Protein 


 


Albumin 


 


Urine Color 


 


Urine Appearance 


 


Urine pH 


 


Ur Specific Gravity 


 


Urine Protein 


 


Urine Glucose (UA) 


 


Urine Ketones 


 


Urine Blood 


 


Urine Nitrite 


 


Ur Leukocyte Esterase 


 


Urine WBC (Auto) 


 


Urine RBC (Auto) 


 


Blood Type 


 


Antibody Screen 











Impressions: 


 





Obstetrics Ultrasound  17 00:00


IMPRESSION:  LIMITED OBSTETRICAL ULTRASOUND WITH MEASURED PARAMETERS DELINEATED 

ABOVE.


Trimester of pregnancy: Third trimester - 28 weeks to delivery.


 











Status: Imported from [facility] [place]





Assessment & Plan





- Diagnosis


(1) Gestational hypertension


Qualifiers: 


     Trimester: third trimester   


Is this a current diagnosis for this admission?: Yes





(2) Hepatitis C carrier


Is this a current diagnosis for this admission?: Yes





(3) Limited prenatal care


Qualifiers: 


     Trimester: third trimester        Qualified Code(s): O09.33 - Supervision 

of pregnancy with insufficient  care, third trimester  


Is this a current diagnosis for this admission?: Yes








- Time


Time Spent with patient: 15-24 minutes


Medications reviewed and adjusted accordingly: Yes


Anticipated discharge: Home


Within: within 48 hours - Will await Cardiology consult

## 2017-07-10 NOTE — CONSULTATION REPORT E
Consultation Report



NAME: ADA DIANA

MRN:  [id number]               : 1990      AGE: 27Y

DATE:  07/10/2017     208  A



TO:   PATO TITUS M.D.



FROM:

      Requesting Physician



REASON FOR CONSULTATION:

Sinus tachycardia in a patient who is in third trimester of pregnancy.



HISTORY:

The patient is a morbidly obese  female who has been diagnosed

with having gestational diabetes in her third trimester of pregnancy.  She

is morbid obesity and she states that since the last 3 weeks, she has been

having rapid racing heart beat, but no associated shortness of breath,

chest pain, dizziness, near syncope or syncope.  There is no PND or

orthopnea.  There is no dyspnea.  The patient states that since the last 3

weeks, she has been feeling more anxious and has episodes where she

becomes extremely nervous and her heart rate jumps up to the 130s to 140s.

At present, the patient is comfortable.  She is not aware of her heart

racing.



PAST MEDICAL HISTORY:

Positive for:

1.   History of preeclampsia in her first pregnancy.

2.   Gestational diabetes in her second pregnancy.

3.   Now in the third trimester of her third pregnancy, she has got

gestational hypertension.

4.   The patient claims that she has a history of asthma, but has not had

an asthmatic attack in a long time.

5.   She has no history of sleep apnea.

6.   She has no history of diabetes mellitus or gestational diabetes.

7.   There is no history of headaches, seizures or migraines.

8.   There is no syncope.

9.   She does have palpitations as mentioned earlier.

10.  There is no history of congenital heart disease.

11.  She states she has anxiety and has 'nervous spells'.



PAST SURGICAL HISTORY:

Cyst removed from the left wrist.



ALLERGIES:

She has no known allergies.



SOCIAL HISTORY:

The patient does not smoke.  There is no history of EtOH abuse.



FAMILY HISTORY:

She states her mother has a murmur, but no other illnesses in the family.



DISPOSITION:

The patient is a F[employer].  Her mother is her surrogate healthcare

decision maker.



MEDICATIONS:

1.   Michael-In-Sol 12 mg IM daily.

2.   Labetalol 100 mg p.o. every 12 hours.



REVIEW OF SYMPTOMS:

CONSTITUTIONAL:  Denies any fever, chills, or rigors.  Does complain of

some fatigue, but no generalized weakness.

HEAD:  Denies headaches or head injury or dizziness.

EYES:  No history of amblyopia or diplopia.  No history of amaurosis

fugax.

EARS:  No history of hearing loss.  No history of tinnitus.  No history of

recurrent ear infections.  No history of vertigo.

NOSE:  No history of hay fever.  No history of nosebleeds.  No history of

nasal allergies.

MOUTH:  No history of altered taste sensation.  No history of ulcers in

the mouth.  No bleeding from the gums.

THROAT:  No odynophagia or dysphagia.  No history of recurrent sore

throats.

SKIN:  No history of pruritus.  No history of yellowish discoloration of

the skin.  No history of skin cancer or psoriasis.

NECK:  No painless or painful swelling of the neck.  No goiter.  No neck

pain.

LUNGS:  Past history of asthma in the past, but no attacks in a long time.

No history of cough or sputum production.  No history of COPD.  No history

of sleep apnea.  No history of pulmonary embolism.  No history of

pleuritic chest pain.  No history of cough.

CARDIAC:  No history of congestive heart failure.  No history of chest

pain or discomfort.  No history of congenital heart disease.  There is no

history of PND, orthopnea or shortness of breath.  The patient complains

of palpitations over the last 3 weeks, but has no other symptoms

associated with that.  There is no leg edema.  There is no near syncope,

syncope, or dizziness.  The patient has been noted to be having

gestational diabetes and the patient is on labetalol for that.

GASTROINTESTINAL:  No history of GI bleed.  No history of peptic ulcer

disease.  No history of jaundice.  No  history of fatty food intolerance. 

No history of cirrhosis.  No abdominal pain.

OB/GYN:  The patient has been found to have gestational diabetes in her

third trimester.

RENAL:  Negative for any chronic kidney disease.  No history of symptoms

of UTI.  No history of hematuria, pyuria or dysuria.

MUSCULOSKELETAL:  Denies arthritis, collagen vascular disease.

METABOLIC:  No history of gout.  No history of hyperlipidemia.  History of

obesity present.

CENTRAL NERVOUS SYSTEM:  No history of seizures, headaches or migraines. 

No history of TIA or CVA.  No history of gait imbalance.

PSYCHIATRIC:  No history of depression, but the patient does have a

history of anxiety and states she becomes nervous for no reason, at which

time, her heart rate goes up.

VASCULAR:  No history of calf or buttock claudication.  No history of DVT.

HEMATOLOGIC:  No history of anemia.  No history of bleeding diathesis.  No

history of clotting disorders.  The rest of the review of symptoms is

negative for any fever, chills or rigors.

ENDOCRINE:  No history of diabetes mellitus.  No history of thyroid

disease.  No history of heat or cold intolerance.  No history of

polydipsia or polyuria.  No history of hirsutism.  No history of excessive

sweating.



PHYSICAL EXAMINATION:

GENERAL:  On examination, the patient is morbidly obese, but well groomed

and at present, in no acute distress.



VITAL SIGNS:  She is afebrile with a temperature of 97.7 degrees

Fahrenheit.  Her pulse is 110 beats per minute, regular.  Blood pressure

is 125/74.  Respirations 16 per minute.  O2 saturation 98% on room air.



HEENT:  Head is atraumatic and normocephalic.  Eyes; pupils are equal,

round, regular, reactive to light and accommodation.  Extraocular

movements are normal.  There is no conjunctival pallor.  There is no

scleral icterus.  Ears; tympanic membranes are intact.  External auditory

canals are clear.  Nose; there is no deviated nasal septum.  There is no

inflammation of the nasal mucous membranes.  Mouth; mucous membranes of

the mouth are moist.  Tongue is moist.  There are no ulcers.  There is no

bleeding from the gums.  Throat; there is no redness of the oropharynx. 

There are no exudates in the throat.



SKIN:  There are no skin rashes.  There is no petechia or ecchymosis. 

There are no skin lesions.



NECK:  Supple.  There is no JVD.  Carotids are equal.  There is no bruit. 

There is no lymphadenopathy.  Trachea is central.  There is no goiter.



LUNGS:  There is no accessory muscles or respiration in use.  Lungs are

clear to auscultation and percussion.



CHEST:  There is no chest wall tenderness.



HEART:  S1 and S2 are heard.  There is no S3 gallop.  There is no S4

gallop.  There is a systolic murmur at the left sternal border at the apex

without radiation.  There is no rub.  There are no gallops.



ABDOMEN:  Soft, obese, nontender.  There is no hepatosplenomegaly.  Bowel

sounds are well heard.  There are no tender areas or masses.



OB/GYN:  The patient has gravid uterus which is 28 weeks pregnant.



EXTREMITIES:  Femorals are deep.  Femorals are slightly diminished  There

are no femoral bruits.  Leg pulses are well felt.  There is no pedal

edema.  There is no cyanosis or clubbing.  There is no DVT or cellulitis. 

There is no calf tenderness.  Capillary refill is normal.



CENTRAL NERVOUS SYSTEM:  The patient is conscious, awake, alert, oriented

x3 with no focal deficits.



PSYCHIATRIC:  The patient does appear to be slightly anxious and nervous,

but her judgment and insight are intact.  Her affect is normal.



ECHOCARDIOGRAM:

Shows normal left ventricular and right ventricular systolic and diastolic

functions.  There is subtle prolapse of the anterior mitral valve leaflet

with trace mitral regurgitation.  There is trace tricuspid regurgitation

with no pulmonary hypertension.  The aortic valve has no evidence of

stenosis or regurgitation.



LABORATORY:

White count 9500, hemoglobin 12.1, hematocrit 36.2, platelet count

276,000.  Sodium 138.5, potassium 4.8, chloride 107, CO2 of 21, BUN 9,

creatinine 0.42, GFR greater than 60, glucose 90.  Uric acid is 3.6. 

Calcium 9.7.  Liver function tests are normal.  Total protein is 7.2,

albumin normal at 3.5.  TSH 0.59, free T4 is slightly low at 0.60, free T3

is slightly low at 2.40.  Magnesium mildly low at 1.5.



IMPRESSION:

1.   Sinus tachycardia secondary to most likely subtle mitral valve

prolapse, obesity, anxiety, and mildly decreased magnesium level.  The

patient is reassured.  Would get a 30-day event monitor to make sure that

the patient does not have SVT.

2.   Gestational hypertension.  Agree with continuing the patient on

labetalol.

3.   Subtle prolapse of the mitral valve anterior leaflet.  No significant

regurgitant lesions.  The patient is reassured that this should not be a

major problem.

4.   Morbid obesity.

5.   Mild hypomagnesemia.

6.   Third trimester of pregnancy.

7.   History of asthma.  No attacks recently.

8.   History of anxiety.



RECOMMENDATIONS:

1.   If no contraindication, would recommend that the patient's magnesium

be replaced in the form of magnesium oxide 40 mg p.o. daily x4 days.

2.   Will get a 30-day event monitor.  The patient was given my cell

number to call me.  We will arrange for the 30-day event monitor which

will be sent to the patient's home.

3.   Discussed with Dr. Villegas.  The patient most likely can go home from a

cardiac point of view.

4.   Echo results discussed with the patient.



NOTE:

Forty minutes spent on this patient.  The patient was seen at 1000 hours

and 40 minutes were spent on the patient, with more than 50% of the time

spent in direct patient care.  Also discussed with OB/GYN about the

patient, discussed with the patient the echo findings.  Note this involved

a moderately complex medical decision making.  Will follow the patient as

an outpatient.







DICTATING PHYSICIAN: PATO TITUS M.D.





[id number]                  DT: 07/10/2017    1419

PHY#: [id number]            DD: 07/10/2017    1410

ID:   [id number]           JOB#: [id number]      ACCT: [id number]



cc:PATO TITUS M.D.

>







MTDD

## 2017-07-10 NOTE — XCELERA REPORT
[address] [address]

                             Tel: [phone]

                             Fax: [phone]



                    Transthoracic Echocardiogram Report

____________________________________________________________________________



Name: ADA DIANA

MRN: [id number]                Age: 27 yrs

Gender: Female                 : 1990

Patient Status: Inpatient      Patient Location: 2N\S\208\S\A

Account #: [id number]

Study Date: 07/10/2017 08:07 AM

Accession #: [id number]

____________________________________________________________________________



Height: 62 in        Weight: 245 lb        BSA: 2.1 m2



____________________________________________________________________________

Procedure: A two-dimensional transthoracic echocardiogram with color flow

and Doppler was performed. The study was technically adequate with some

images being suboptimal in quality.

Reason For Study: TACHYCSRDIA



History: TACHYCARDUIA.

Ordering Physician: FLORENCIA TITUS

Performed By: Addison White

____________________________________________________________________________





Interpretation Summary

The left ventricle is normal in size.

There is normal left ventricular wall thickness.

LV EF is > than 60%

Left ventricular systolic function is normal.

Doppler measurements suggest normal left ventricular diastolic function

The left ventricular wall motion is normal.

There is no thrombus.

There is no ventricular septal defect visualized.

The right ventricle is normal in size and function.

The right atrium is normal.

The left atrial size is normal.

The interatrial septum is intact with no evidence for an atrial septal

defect.

There is subtle prolapse of the anterior mitral valve prolapse.

There is no vegetation seen on the mitral valve.

There is no mitral valve stenosis.

There is a trace amount of mitral regurgitation

The aortic valve is trileaflet.

The aortic valve is normal in structure and functions normally

The aortic valve opens well.

There is no aortic valvular vegetation.

There is no aortic valve stenosis

There is no LVOT obstruction.

No aortic regurgitation is present.

There is no tricuspid stenosis.

There is a trace amount of tricuspid regurgitation

Right ventricular systolic pressure is normal.

RVSP is 26.3 mm of Hg ,with RA mean of 5.

There is no pulmonic valvular stenosis.

There is a trace amount of pulmonic regurgitation

The aortic root is normal size.

There is no pericardial effusion.



____________________________________________________________________________



MMode/2D Measurements \T\ Calculations

RVDd: 2.5 cm  LVIDd: 4.5 cm  FS: 33.1 %            Ao root diam: 3.1 cm

IVSd: 1.1 cm  LVIDs: 3.0 cm  EDV(Teich): 90.5 ml

              LVPWd: 1.2 cm  ESV(Teich): 34.5 ml   Ao root area: 7.7 cm2

                             EF(Teich): 61.9 %



Doppler Measurements \T\ Calculations

MV E max liliane:      MV dec slope:       Ao V2 max:         LV V1 max P.3 cm/sec                            146.6 cm/sec       4.0 mmHg

MV A max liliane:      840.1 cm/sec2       Ao max PG:         LV V1 max:

74.2 cm/sec        MV dec time:        8.6 mmHg           99.6 cm/sec

MV E/A: 1.2        0.10 sec



        _____________________________________________________________

PA V2 max:         PI end-d liliane:       TR max liliane:        RAP systole:

96.7 cm/sec        140.1 cm/sec        229.9 cm/sec       5.0 mmHg

PA max PG:                             TR max PG:

3.7 mmHg                               21.3 mmHg

                                       RVSP(TR):

                                       26.3 mmHg



____________________________________________________________________________

Left Ventricle

The left ventricle is normal in size. There is normal left ventricular wall

thickness. LV EF is > than 60%. Left ventricular systolic function is

normal. Doppler measurements suggest normal left ventricular diastolic

function. The left ventricular wall motion is normal. There is no thrombus.

There is no ventricular septal defect visualized.



Right Ventricle

The right ventricle is normal in size and function.



Atria

The right atrium is normal. The left atrial size is normal. The interatrial

septum is intact with no evidence for an atrial septal defect.





Mitral Valve

There is subtle prolapse of the anterior mitral valve prolapse. There is no

vegetation seen on the mitral valve. There is no mitral valve stenosis.

There is a trace amount of mitral regurgitation.



Aortic Valve

The aortic valve is trileaflet. The aortic valve is normal in structure and

functions normally. The aortic valve opens well. There is no aortic

valvular vegetation. There is no aortic valve stenosis. There is no LVOT

obstruction. No aortic regurgitation is present.



Tricuspid Valve

There is no tricuspid stenosis. There is a trace amount of tricuspid

regurgitation. Right ventricular systolic pressure is normal. RVSP is 26.3

mm of Hg ,with RA mean of 5.



Pulmonic Valve

There is no pulmonic valvular stenosis. There is a trace amount of pulmonic

regurgitation.



Great Vessels

The aortic root is normal size.





Effusions

There is no pericardial effusion.



____________________________________________________________________________



Electronically signed by:      Florencia Titus      on 07/10/2017 10:08

AM



CC: FLORENCIA TITUS

>

Florencia Titus

## 2017-07-10 NOTE — EKG REPORT
SEVERITY:- ABNORMAL ECG -

SINUS TACHYCARDIA

PROBABLE LEFT VENTRICULAR HYPERTROPHY

BORDERLINE PROLONGED QT INTERVAL

:

Confirmed by: Addison Niño MD 10-Jul-2017 08:17:49

## 2017-07-26 ENCOUNTER — HOSPITAL ENCOUNTER (OUTPATIENT)
Dept: HOSPITAL 62 - LC | Age: 27
Setting detail: OBSERVATION
LOS: 3 days | Discharge: HOME | End: 2017-07-29
Attending: OBSTETRICS & GYNECOLOGY | Admitting: OBSTETRICS & GYNECOLOGY
Payer: MEDICAID

## 2017-07-26 DIAGNOSIS — O98.413: Primary | ICD-10-CM

## 2017-07-26 DIAGNOSIS — B19.20: ICD-10-CM

## 2017-07-26 DIAGNOSIS — O09.33: ICD-10-CM

## 2017-07-26 DIAGNOSIS — Z3A.30: ICD-10-CM

## 2017-07-26 DIAGNOSIS — O14.93: ICD-10-CM

## 2017-07-26 PROCEDURE — 4A0HXCZ MEASUREMENT OF PRODUCTS OF CONCEPTION, CARDIAC RATE, EXTERNAL APPROACH: ICD-10-PCS | Performed by: OBSTETRICS & GYNECOLOGY

## 2017-07-26 PROCEDURE — 87591 N.GONORRHOEAE DNA AMP PROB: CPT

## 2017-07-26 PROCEDURE — 85027 COMPLETE CBC AUTOMATED: CPT

## 2017-07-26 PROCEDURE — 83615 LACTATE (LD) (LDH) ENZYME: CPT

## 2017-07-26 PROCEDURE — 81001 URINALYSIS AUTO W/SCOPE: CPT

## 2017-07-26 PROCEDURE — 87491 CHLMYD TRACH DNA AMP PROBE: CPT

## 2017-07-26 PROCEDURE — 80307 DRUG TEST PRSMV CHEM ANLYZR: CPT

## 2017-07-26 PROCEDURE — 86762 RUBELLA ANTIBODY: CPT

## 2017-07-26 PROCEDURE — 87340 HEPATITIS B SURFACE AG IA: CPT

## 2017-07-26 PROCEDURE — 82570 ASSAY OF URINE CREATININE: CPT

## 2017-07-26 PROCEDURE — 84156 ASSAY OF PROTEIN URINE: CPT

## 2017-07-26 PROCEDURE — 85025 COMPLETE CBC W/AUTO DIFF WBC: CPT

## 2017-07-26 PROCEDURE — 86804 HEP C AB TEST CONFIRM: CPT

## 2017-07-26 PROCEDURE — G0378 HOSPITAL OBSERVATION PER HR: HCPCS

## 2017-07-26 PROCEDURE — 84550 ASSAY OF BLOOD/URIC ACID: CPT

## 2017-07-26 PROCEDURE — 80053 COMPREHEN METABOLIC PANEL: CPT

## 2017-07-26 PROCEDURE — 36415 COLL VENOUS BLD VENIPUNCTURE: CPT

## 2017-07-26 PROCEDURE — 86592 SYPHILIS TEST NON-TREP QUAL: CPT

## 2017-07-26 PROCEDURE — 86803 HEPATITIS C AB TEST: CPT

## 2017-07-26 PROCEDURE — 86701 HIV-1ANTIBODY: CPT

## 2017-07-26 PROCEDURE — 76815 OB US LIMITED FETUS(S): CPT

## 2017-07-26 PROCEDURE — 59025 FETAL NON-STRESS TEST: CPT

## 2017-07-27 LAB
ADD HIVPANEL?: NO
ALBUMIN SERPL-MCNC: 3.1 G/DL (ref 3.5–5)
ALP SERPL-CCNC: 71 U/L (ref 38–126)
ALT SERPL-CCNC: 30 U/L (ref 9–52)
ANION GAP SERPL CALC-SCNC: 10 MMOL/L (ref 5–19)
APPEARANCE UR: (no result)
AST SERPL-CCNC: 19 U/L (ref 14–36)
BARBITURATES UR QL SCN: NEGATIVE
BASOPHILS # BLD AUTO: 0 10^3/UL (ref 0–0.2)
BASOPHILS NFR BLD AUTO: 0.5 % (ref 0–2)
BILIRUB DIRECT SERPL-MCNC: 0.2 MG/DL (ref 0–0.4)
BILIRUB SERPL-MCNC: 0.3 MG/DL (ref 0.2–1.3)
BILIRUB UR QL STRIP: NEGATIVE
BUN SERPL-MCNC: 7 MG/DL (ref 7–20)
CALCIUM: 9.4 MG/DL (ref 8.4–10.2)
CHLAM PCR: NOT DETECTED
CHLORIDE SERPL-SCNC: 107 MMOL/L (ref 98–107)
CO2 SERPL-SCNC: 21 MMOL/L (ref 22–30)
CREAT SERPL-MCNC: 0.46 MG/DL (ref 0.52–1.25)
CREAT UR-MCNC: 73.3 MG/DL (ref 16–327)
EOSINOPHIL # BLD AUTO: 0.1 10^3/UL (ref 0–0.6)
EOSINOPHIL NFR BLD AUTO: 1.8 % (ref 0–6)
ERYTHROCYTE [DISTWIDTH] IN BLOOD BY AUTOMATED COUNT: 15 % (ref 11.5–14)
GLUCOSE SERPL-MCNC: 104 MG/DL (ref 75–110)
GLUCOSE UR STRIP-MCNC: NEGATIVE MG/DL
HCT VFR BLD CALC: 32.8 % (ref 36–47)
HGB BLD-MCNC: 11.1 G/DL (ref 12–15.5)
HGB HCT DIFFERENCE: 0.5
HIV (1 AND 2) ANTIBODY: NEGATIVE
KETONES UR STRIP-MCNC: NEGATIVE MG/DL
LDH1 SERPL-CCNC: 420 U/L (ref 313–618)
LYMPHOCYTES # BLD AUTO: 2.5 10^3/UL (ref 0.5–4.7)
LYMPHOCYTES NFR BLD AUTO: 38.7 % (ref 13–45)
MCH RBC QN AUTO: 28.8 PG (ref 27–33.4)
MCHC RBC AUTO-ENTMCNC: 34 G/DL (ref 32–36)
MCV RBC AUTO: 85 FL (ref 80–97)
METHADONE UR QL SCN: NEGATIVE
MONOCYTES # BLD AUTO: 0.7 10^3/UL (ref 0.1–1.4)
MONOCYTES NFR BLD AUTO: 11 % (ref 3–13)
NEUTROPHILS # BLD AUTO: 3.1 10^3/UL (ref 1.7–8.2)
NEUTS SEG NFR BLD AUTO: 48 % (ref 42–78)
NITRITE UR QL STRIP: NEGATIVE
PCP UR QL SCN: NEGATIVE
PH UR STRIP: 6 [PH] (ref 5–9)
POTASSIUM SERPL-SCNC: 4.3 MMOL/L (ref 3.6–5)
PROT SERPL-MCNC: 6.4 G/DL (ref 6.3–8.2)
PROT UR STRIP-MCNC: 19.9 MG/DL (ref ?–12)
PROT UR STRIP-MCNC: 30 MG/DL
RBC # BLD AUTO: 3.86 10^6/UL (ref 3.72–5.28)
RUBV IGG SER-ACNC: 9.2 IU/ML
SODIUM SERPL-SCNC: 137.7 MMOL/L (ref 137–145)
SP GR UR STRIP: 1.01
URATE SERPL-MCNC: 4.3 MG/DL (ref 2.5–6.2)
URINE OPIATES LOW: NEGATIVE
UROBILINOGEN UR-MCNC: NEGATIVE MG/DL (ref ?–2)
WBC # BLD AUTO: 6.5 10^3/UL (ref 4–10.5)

## 2017-07-27 RX ADMIN — LABETALOL HYDROCHLORIDE SCH MG: 200 TABLET, FILM COATED ORAL at 22:09

## 2017-07-28 LAB
HCV AB SER IA-ACNC: <0.1 S/CO RATIO (ref 0–0.9)
PROT UR STRIP-MCNC: 18.3 MG/DL (ref ?–12)

## 2017-07-28 RX ADMIN — LABETALOL HYDROCHLORIDE SCH MG: 200 TABLET, FILM COATED ORAL at 09:54

## 2017-07-28 RX ADMIN — LABETALOL HYDROCHLORIDE SCH MG: 200 TABLET, FILM COATED ORAL at 21:49

## 2017-07-28 NOTE — RADIOLOGY REPORT (SQ)
EXAM DESCRIPTION:  U/S OB LIMITED



COMPLETED DATE/TIME:  7/28/2017 1:53 pm



REASON FOR STUDY:  needs ORTIZ, Chronic HTN 30+ WEEKS



COMPARISON:  7/9/2017.



TECHNIQUE:  Limited transabdominal grayscale ultrasound for evaluation of specific requested obstetri
parker parameters.



LIMITATIONS:  None.



FINDINGS:  CERVICAL LENGTH: 3.0 cm.   Closed.

ORTIZ: 8.2 cm.

FHR: 175 beats per minute.

PRESENTATION: Cephalic.

OTHER: No other significant findings.



IMPRESSION:  LIMITED OBSTETRICAL ULTRASOUND WITH MEASURED PARAMETERS DELINEATED ABOVE.

Trimester of pregnancy: Third trimester - 28 weeks to delivery.



TECHNICAL DOCUMENTATION:  JOB ID:  [id number]

 2011 [employer]- All Rights Reserved

## 2017-07-28 NOTE — PDOC PROGRESS REPORT
Subjective


Progress Note for:: 07/28/17


Subjective:: 


Pt notes very mild headache...does not need pain med. No visual changes or 

epigastric pain. No ctxs, bleeding. Notes FM.








Physical Exam





- Physical Exam


Vital Signs: 


 











Temp Pulse Resp BP Pulse Ox


 


 98.0 F   110 H  18   133/91 H  99 


 


 07/28/17 07:55  07/28/17 07:55  07/28/17 07:55  07/28/17 07:55  07/28/17 07:55








 Intake & Output











 07/27/17 07/28/17 07/29/17





 06:59 06:59 06:59


 


Intake Total  840 


 


Output Total  2320 


 


Balance  -1480 


 


Weight 115.8 kg  











General appearance: PRESENT: no acute distress - abd soft nontender extrem 

nontender nst-appropriate for gest age





Result


Laboratory Results: 


 





 07/27/17 00:03 





 07/27/17 00:03 





 











  07/28/17





  02:15


 


Ur 24 Hour Volume  2320


 


Ur Total Protein 24 Hr  425 H














Assessment & Plan





- Plan Summary


Plan Summary: 


cont labetolol. Will check melly. Possible d/c home later today with twice weekly 

testing and preeclampsia precautions. Discussed only light activity if 

discharged.

## 2017-07-29 VITALS — DIASTOLIC BLOOD PRESSURE: 74 MMHG | SYSTOLIC BLOOD PRESSURE: 137 MMHG

## 2017-07-29 LAB
ALBUMIN SERPL-MCNC: 2.9 G/DL (ref 3.5–5)
ALP SERPL-CCNC: 71 U/L (ref 38–126)
ALT SERPL-CCNC: 25 U/L (ref 9–52)
ANION GAP SERPL CALC-SCNC: 7 MMOL/L (ref 5–19)
AST SERPL-CCNC: 18 U/L (ref 14–36)
BILIRUB DIRECT SERPL-MCNC: 0.2 MG/DL (ref 0–0.4)
BILIRUB SERPL-MCNC: 0.4 MG/DL (ref 0.2–1.3)
BUN SERPL-MCNC: 9 MG/DL (ref 7–20)
CALCIUM: 9.1 MG/DL (ref 8.4–10.2)
CHLORIDE SERPL-SCNC: 108 MMOL/L (ref 98–107)
CO2 SERPL-SCNC: 23 MMOL/L (ref 22–30)
CREAT SERPL-MCNC: 0.49 MG/DL (ref 0.52–1.25)
ERYTHROCYTE [DISTWIDTH] IN BLOOD BY AUTOMATED COUNT: 14.8 % (ref 11.5–14)
GLUCOSE SERPL-MCNC: 86 MG/DL (ref 75–110)
HCT VFR BLD CALC: 31.3 % (ref 36–47)
HGB BLD-MCNC: 10.7 G/DL (ref 12–15.5)
HGB HCT DIFFERENCE: 0.8
LDH1 SERPL-CCNC: 410 U/L (ref 313–618)
MCH RBC QN AUTO: 28.7 PG (ref 27–33.4)
MCHC RBC AUTO-ENTMCNC: 34.1 G/DL (ref 32–36)
MCV RBC AUTO: 84 FL (ref 80–97)
POTASSIUM SERPL-SCNC: 4.3 MMOL/L (ref 3.6–5)
PROT SERPL-MCNC: 6.1 G/DL (ref 6.3–8.2)
RBC # BLD AUTO: 3.73 10^6/UL (ref 3.72–5.28)
SODIUM SERPL-SCNC: 138 MMOL/L (ref 137–145)
WBC # BLD AUTO: 6 10^3/UL (ref 4–10.5)

## 2017-07-29 RX ADMIN — LABETALOL HYDROCHLORIDE SCH MG: 200 TABLET, FILM COATED ORAL at 09:32

## 2017-07-29 NOTE — PDOC PROGRESS REPORT
Subjective


Subjective:: 


Pt reports no ctx's, vb or lof.


Reports good fetal movement


No s/s of Pre-E





Physical Exam





- Physical Exam


Vital Signs: 


 











Temp Pulse Resp BP Pulse Ox


 


 98.3 F   84   16   137/74 H  99 


 


 17 07:47  17 07:47  17 07:47  17 07:47  17 07:47








 Intake & Output











 17





 06:59 06:59 06:59


 


Intake Total 840 240 


 


Output Total 2320  


 


Balance -1480 240 











General appearance: PRESENT: no acute distress, cooperative, well-developed


Neurological exam: PRESENT: reflexes normal





Result


Laboratory Results: 


 





 17 06:01 





 17 06:01 





 











  17





  06:01 06:01


 


WBC  6.0 


 


RBC  3.73 


 


Hgb  10.7 L 


 


Hct  31.3 L 


 


MCV  84 


 


MCH  28.7 


 


MCHC  34.1 


 


RDW  14.8 H 


 


Plt Count  207 


 


Sodium   138.0


 


Potassium   4.3


 


Chloride   108 H


 


Carbon Dioxide   23


 


Anion Gap   7


 


BUN   9


 


Creatinine   0.49 L


 


Est GFR ( Amer)   > 60


 


Est GFR (Non-Af Amer)   > 60


 


Glucose   86


 


Calcium   9.1


 


Total Bilirubin   0.4


 


AST   18


 


ALT   25


 


Alkaline Phosphatase   71


 


Total Protein   6.1 L


 


Albumin   2.9 L











Impressions: 


 





Obstetrics Ultrasound  17 00:00


IMPRESSION:  LIMITED OBSTETRICAL ULTRASOUND WITH MEASURED PARAMETERS DELINEATED 

ABOVE.


Trimester of pregnancy: Third trimester - 28 weeks to delivery.


 














Assessment & Plan





- Diagnosis


(1) Hepatitis C carrier


Is this a current diagnosis for this admission?: Yes





(2) Limited prenatal care


Qualifiers: 


     Trimester: third trimester        Qualified Code(s): O09.33 - Supervision 

of pregnancy with insufficient  care, third trimester  


Is this a current diagnosis for this admission?: Yes





(3) Pre-eclampsia in third trimester


Is this a current diagnosis for this admission?: Yes








- Time


Time Spent with patient: Less than 15 minutes


Medications reviewed and adjusted accordingly: Yes


Anticipated discharge: Home - Will d/c home Continue Labetalol 200mg bid Pre-E 

precautions given keep f/u appt on Tuesday

## 2017-08-01 ENCOUNTER — HOSPITAL ENCOUNTER (OUTPATIENT)
Dept: HOSPITAL 62 - LC | Age: 27
Discharge: HOME | End: 2017-08-01
Attending: OBSTETRICS & GYNECOLOGY
Payer: MEDICAID

## 2017-08-01 DIAGNOSIS — Z3A.31: ICD-10-CM

## 2017-08-01 DIAGNOSIS — O13.3: Primary | ICD-10-CM

## 2017-08-01 LAB
ALBUMIN SERPL-MCNC: 3.2 G/DL (ref 3.5–5)
ALP SERPL-CCNC: 87 U/L (ref 38–126)
ALT SERPL-CCNC: 23 U/L (ref 9–52)
ANION GAP SERPL CALC-SCNC: 10 MMOL/L (ref 5–19)
APPEARANCE UR: (no result)
AST SERPL-CCNC: 22 U/L (ref 14–36)
BARBITURATES UR QL SCN: NEGATIVE
BASOPHILS # BLD AUTO: 0 10^3/UL (ref 0–0.2)
BASOPHILS NFR BLD AUTO: 0.5 % (ref 0–2)
BILIRUB DIRECT SERPL-MCNC: 0.3 MG/DL (ref 0–0.4)
BILIRUB SERPL-MCNC: 0.4 MG/DL (ref 0.2–1.3)
BILIRUB UR QL STRIP: NEGATIVE
BUN SERPL-MCNC: 9 MG/DL (ref 7–20)
CALCIUM: 9.1 MG/DL (ref 8.4–10.2)
CHLORIDE SERPL-SCNC: 107 MMOL/L (ref 98–107)
CO2 SERPL-SCNC: 20 MMOL/L (ref 22–30)
CREAT SERPL-MCNC: 0.47 MG/DL (ref 0.52–1.25)
EOSINOPHIL # BLD AUTO: 0.1 10^3/UL (ref 0–0.6)
EOSINOPHIL NFR BLD AUTO: 1.2 % (ref 0–6)
ERYTHROCYTE [DISTWIDTH] IN BLOOD BY AUTOMATED COUNT: 14.9 % (ref 11.5–14)
GLUCOSE SERPL-MCNC: 77 MG/DL (ref 75–110)
GLUCOSE UR STRIP-MCNC: NEGATIVE MG/DL
HCT VFR BLD CALC: 31.8 % (ref 36–47)
HGB BLD-MCNC: 10.9 G/DL (ref 12–15.5)
HGB HCT DIFFERENCE: 0.9
KETONES UR STRIP-MCNC: NEGATIVE MG/DL
LDH1 SERPL-CCNC: 460 U/L (ref 313–618)
LYMPHOCYTES # BLD AUTO: 1.8 10^3/UL (ref 0.5–4.7)
LYMPHOCYTES NFR BLD AUTO: 30.4 % (ref 13–45)
MCH RBC QN AUTO: 28.6 PG (ref 27–33.4)
MCHC RBC AUTO-ENTMCNC: 34.2 G/DL (ref 32–36)
MCV RBC AUTO: 84 FL (ref 80–97)
METHADONE UR QL SCN: NEGATIVE
MONOCYTES # BLD AUTO: 0.6 10^3/UL (ref 0.1–1.4)
MONOCYTES NFR BLD AUTO: 10 % (ref 3–13)
NEUTROPHILS # BLD AUTO: 3.4 10^3/UL (ref 1.7–8.2)
NEUTS SEG NFR BLD AUTO: 57.9 % (ref 42–78)
NITRITE UR QL STRIP: NEGATIVE
PCP UR QL SCN: NEGATIVE
PH UR STRIP: 6 [PH] (ref 5–9)
POTASSIUM SERPL-SCNC: 4.2 MMOL/L (ref 3.6–5)
PROT SERPL-MCNC: 6.6 G/DL (ref 6.3–8.2)
PROT UR STRIP-MCNC: 100 MG/DL
RBC # BLD AUTO: 3.8 10^6/UL (ref 3.72–5.28)
SODIUM SERPL-SCNC: 136.9 MMOL/L (ref 137–145)
SP GR UR STRIP: 1.03
URATE SERPL-MCNC: 5.2 MG/DL (ref 2.5–6.2)
URINE OPIATES LOW: NEGATIVE
UROBILINOGEN UR-MCNC: NEGATIVE MG/DL (ref ?–2)
WBC # BLD AUTO: 6 10^3/UL (ref 4–10.5)

## 2017-08-01 PROCEDURE — 83615 LACTATE (LD) (LDH) ENZYME: CPT

## 2017-08-01 PROCEDURE — 36415 COLL VENOUS BLD VENIPUNCTURE: CPT

## 2017-08-01 PROCEDURE — 80053 COMPREHEN METABOLIC PANEL: CPT

## 2017-08-01 PROCEDURE — 84550 ASSAY OF BLOOD/URIC ACID: CPT

## 2017-08-01 PROCEDURE — 4A1HXCZ MONITORING OF PRODUCTS OF CONCEPTION, CARDIAC RATE, EXTERNAL APPROACH: ICD-10-PCS | Performed by: OBSTETRICS & GYNECOLOGY

## 2017-08-01 PROCEDURE — 81001 URINALYSIS AUTO W/SCOPE: CPT

## 2017-08-01 PROCEDURE — 85025 COMPLETE CBC W/AUTO DIFF WBC: CPT

## 2017-08-01 PROCEDURE — 80307 DRUG TEST PRSMV CHEM ANLYZR: CPT

## 2017-08-04 ENCOUNTER — HOSPITAL ENCOUNTER (INPATIENT)
Dept: HOSPITAL 62 - LC | Age: 27
LOS: 8 days | Discharge: HOME | End: 2017-08-12
Attending: OBSTETRICS & GYNECOLOGY | Admitting: OBSTETRICS & GYNECOLOGY
Payer: MEDICAID

## 2017-08-04 DIAGNOSIS — Z3A.31: ICD-10-CM

## 2017-08-04 LAB
ALBUMIN SERPL-MCNC: 3.4 G/DL (ref 3.5–5)
ALP SERPL-CCNC: 92 U/L (ref 38–126)
ALT SERPL-CCNC: 34 U/L (ref 9–52)
ANION GAP SERPL CALC-SCNC: 8 MMOL/L (ref 5–19)
APPEARANCE UR: (no result)
AST SERPL-CCNC: 24 U/L (ref 14–36)
BARBITURATES UR QL SCN: NEGATIVE
BASOPHILS # BLD AUTO: 0 10^3/UL (ref 0–0.2)
BASOPHILS NFR BLD AUTO: 0.4 % (ref 0–2)
BILIRUB DIRECT SERPL-MCNC: 0.2 MG/DL (ref 0–0.4)
BILIRUB SERPL-MCNC: 0.4 MG/DL (ref 0.2–1.3)
BILIRUB UR QL STRIP: NEGATIVE
BUN SERPL-MCNC: 11 MG/DL (ref 7–20)
CALCIUM: 9.5 MG/DL (ref 8.4–10.2)
CHLORIDE SERPL-SCNC: 108 MMOL/L (ref 98–107)
CO2 SERPL-SCNC: 20 MMOL/L (ref 22–30)
CREAT SERPL-MCNC: 0.57 MG/DL (ref 0.52–1.25)
CREAT UR-MCNC: 209.4 MG/DL (ref 16–327)
EOSINOPHIL # BLD AUTO: 0.1 10^3/UL (ref 0–0.6)
EOSINOPHIL NFR BLD AUTO: 0.9 % (ref 0–6)
ERYTHROCYTE [DISTWIDTH] IN BLOOD BY AUTOMATED COUNT: 14.8 % (ref 11.5–14)
GLUCOSE SERPL-MCNC: 79 MG/DL (ref 75–110)
GLUCOSE UR STRIP-MCNC: NEGATIVE MG/DL
HCT VFR BLD CALC: 33.9 % (ref 36–47)
HGB BLD-MCNC: 11.5 G/DL (ref 12–15.5)
HGB HCT DIFFERENCE: 0.6
KETONES UR STRIP-MCNC: (no result) MG/DL
LDH1 SERPL-CCNC: 535 U/L (ref 313–618)
LYMPHOCYTES # BLD AUTO: 2.4 10^3/UL (ref 0.5–4.7)
LYMPHOCYTES NFR BLD AUTO: 38 % (ref 13–45)
MCH RBC QN AUTO: 28.7 PG (ref 27–33.4)
MCHC RBC AUTO-ENTMCNC: 33.9 G/DL (ref 32–36)
MCV RBC AUTO: 85 FL (ref 80–97)
METHADONE UR QL SCN: NEGATIVE
MONOCYTES # BLD AUTO: 0.6 10^3/UL (ref 0.1–1.4)
MONOCYTES NFR BLD AUTO: 9 % (ref 3–13)
NEUTROPHILS # BLD AUTO: 3.2 10^3/UL (ref 1.7–8.2)
NEUTS SEG NFR BLD AUTO: 51.7 % (ref 42–78)
NITRITE UR QL STRIP: NEGATIVE
PCP UR QL SCN: NEGATIVE
PH UR STRIP: 5 [PH] (ref 5–9)
POTASSIUM SERPL-SCNC: 4.8 MMOL/L (ref 3.6–5)
PROT SERPL-MCNC: 6.7 G/DL (ref 6.3–8.2)
PROT UR STRIP-MCNC: 100 MG/DL
PROT UR STRIP-MCNC: 72.3 MG/DL (ref ?–12)
PROT UR STRIP-MCNC: 75.7 MG/DL (ref ?–12)
RBC # BLD AUTO: 4 10^6/UL (ref 3.72–5.28)
SODIUM SERPL-SCNC: 136.4 MMOL/L (ref 137–145)
SP GR UR STRIP: 1.03
URATE SERPL-MCNC: 6 MG/DL (ref 2.5–6.2)
URINE OPIATES LOW: NEGATIVE
UROBILINOGEN UR-MCNC: NEGATIVE MG/DL (ref ?–2)
WBC # BLD AUTO: 6.2 10^3/UL (ref 4–10.5)

## 2017-08-04 PROCEDURE — 82570 ASSAY OF URINE CREATININE: CPT

## 2017-08-04 PROCEDURE — 80053 COMPREHEN METABOLIC PANEL: CPT

## 2017-08-04 PROCEDURE — 36415 COLL VENOUS BLD VENIPUNCTURE: CPT

## 2017-08-04 PROCEDURE — 84156 ASSAY OF PROTEIN URINE: CPT

## 2017-08-04 PROCEDURE — 88307 TISSUE EXAM BY PATHOLOGIST: CPT

## 2017-08-04 PROCEDURE — 85025 COMPLETE CBC W/AUTO DIFF WBC: CPT

## 2017-08-04 PROCEDURE — 59025 FETAL NON-STRESS TEST: CPT

## 2017-08-04 PROCEDURE — 86900 BLOOD TYPING SEROLOGIC ABO: CPT

## 2017-08-04 PROCEDURE — 94760 N-INVAS EAR/PLS OXIMETRY 1: CPT

## 2017-08-04 PROCEDURE — S0119 ONDANSETRON 4 MG: HCPCS

## 2017-08-04 PROCEDURE — 87205 SMEAR GRAM STAIN: CPT

## 2017-08-04 PROCEDURE — 86592 SYPHILIS TEST NON-TREP QUAL: CPT

## 2017-08-04 PROCEDURE — 85027 COMPLETE CBC AUTOMATED: CPT

## 2017-08-04 PROCEDURE — 87186 SC STD MICRODIL/AGAR DIL: CPT

## 2017-08-04 PROCEDURE — 86850 RBC ANTIBODY SCREEN: CPT

## 2017-08-04 PROCEDURE — 87070 CULTURE OTHR SPECIMN AEROBIC: CPT

## 2017-08-04 PROCEDURE — 84550 ASSAY OF BLOOD/URIC ACID: CPT

## 2017-08-04 PROCEDURE — 83615 LACTATE (LD) (LDH) ENZYME: CPT

## 2017-08-04 PROCEDURE — 87077 CULTURE AEROBIC IDENTIFY: CPT

## 2017-08-04 PROCEDURE — 86901 BLOOD TYPING SEROLOGIC RH(D): CPT

## 2017-08-04 PROCEDURE — 80307 DRUG TEST PRSMV CHEM ANLYZR: CPT

## 2017-08-04 PROCEDURE — 81001 URINALYSIS AUTO W/SCOPE: CPT

## 2017-08-04 NOTE — PDOC H&P
History of Present Illness


Admission Date/PCP: 


  17 15:17





  





Patient complains of: "not feeling right"  concerned about blood pressure


History of Present Illness: 


ADA DIANA is a 27 year old female who has had hypertensive issues in 

all her previous pregnancies the last of which was in late 2016.  She is unsure 

of her gestational age and having trouble establishing care.  Indicates that 

she was told to go to [place] for her care by the health department but does 

not have transportation.  she presented to the ER today worried about her blood 

pressure and having a feeling that "something is wrong.  I just don't feel 

right.  My face is tingling."  Her HR was found to be in the 130s in the ER.  








Past Medical History


Cardiac Medical History: 


   Denies: Congestive Heart Failure, Myocardial Infarction, Hypertension


Pulmonary Medical History: 


   Denies: Asthma, Bronchitis, Chronic Obstructive Pulmonary Disease (COPD), 

Pneumonia, Tuberculosis


Neurological Medical History: 


   Denies: Seizures


Renal/ Medical History: 


   Denies: End Stage Renal Disease


GI Medical History: 


   Denies: Cirrhosis, Gastroesophageal Reflux Disease


Musculoskeltal Medical History: 


   Denies: Arthritis


Psychiatric Medical History: 


   Denies: Bipolar Disorder, Depression





Social History


Smoking Status: Never Smoker


Drugs: None





Family History


Family History: Reviewed & Not Pertinent


Parental Family History Reviewed: Yes


Children Family History Reviewed: Yes


Sibling(s) Family History Reviewed.: Yes





Medication/Allergy


Home Medications: 








Prenatal Vit/Iron Fumarate/FA [Prenatal Tablet] 1 each PO DAILY 10/21/16 


Acetaminophen [Tylenol] 650 mg PO Q6 PRN 17 


Labetalol HCl 100 mg PO BID 17 








Allergies/Adverse Reactions: 


 





No Known Allergies Allergy (Verified 17 16:24)


 











Physical Exam





- Physical Exam


Vital Signs: 


 











Temp Pulse Resp BP Pulse Ox


 


 98.9 F   120 H  16   134/87 H  100 


 


 07/10/17 15:58  07/10/17 15:58  07/10/17 15:58  07/10/17 15:58  07/10/17 15:58











General appearance: PRESENT: no acute distress, cooperative


Pulses: PRESENT: normal radial pulses


Vascular exam: PRESENT: normal capillary refill


GI/Abdominal exam: PRESENT: soft - obese, uterus above umbilicus





Result


Laboratory Results: 


 





 17 16:03 





 17 15:15 








Impressions: 


 





Obstetrics Ultrasound  17 00:00


IMPRESSION:  LIMITED OBSTETRICAL ULTRASOUND WITH MEASURED PARAMETERS DELINEATED 

ABOVE.


Trimester of pregnancy: Third trimester - 28 weeks to delivery.


 














Assessment & Plan





- Diagnosis


(1) Gestational hypertension


Qualifiers: 


     Trimester: third trimester


Is this a current diagnosis for this admission?: Yes





(2) Hepatitis C carrier


Is this a current diagnosis for this admission?: Yes





(3) Limited prenatal care


Qualifiers: 


     Trimester: third trimester     Qualified Code(s): O09.33 - Supervision of 

pregnancy with insufficient  care, third trimester  


Is this a current diagnosis for this admission?: Yes





(4) Tachycardia with heart rate 121-140 beats per minute


Is this a current diagnosis for this admission?: Yes








- Time


Time Spent: 30 to 50 Minutes


Critical Time spent with patient: Less than 15 minutes


Medications reviewed and adjusted accordingly: Yes


Anticipated discharge: Home


Within: within 24 hours





- Plan Summary


Plan Summary: 


as patient has had poor prenatal care and very uncertain as to her ability to 

comply with outpatient 24 hour urine collection and continuation of care, will 

admit for observation to collect 24 hour urine protein as well as get 

cardiology consult due to her repeat episodes of tachycardia since being here.

## 2017-08-04 NOTE — ADMISSION PHYSICAL
=================================================================



=================================================================

Datetime Report Generated by CPN: 2017 12:20

   

   

=================================================================

CURRENT ADMISSION

=================================================================

   

Chief Complaint:  Signs/Symptoms Gestational HTN

Chief Complaint:  Other

Chief Complaint Other:  elevated pulse

Indication for Induction:  Not Applicable

Indication for Induction:  Not Applicable

Admit Plan:  Admit to Unit; Observation/Evaluation

Admit Plan:  Admit to Unit

   

=================================================================

ALLERGIES

=================================================================

   

Medication Allergies:  No

Medication Allergies:  No Known Allergies (2017)

Latex:  No Latex Allergies

Food Allergies:  None

Environmental Allergies:  None

   

=================================================================

OBSTETRICAL HISTORY

=================================================================

   

EDC:  2017 00:00

:  4

Para:  3

Term:  2

:  1

SAB:  0

IAB:  0

Ectopic:  0

Living:  3

Cesareans:  0

VBACs:  0

Multiple Births:  0

Gestational Diabetes:  No

Rh Sensitization:  No

Incompetent Cervix:  No

ANGELA:  No

Infertility:  No

ART Treatment:  No

Uterine Anomaly:  No

IUGR:  No

Hx Previous C/S:  No

Macrosomia:  No

Hx Loss/Stillborn:  No

PIH:  Yes

Hx  Death:  No

Placenta Previa/Abruption:  No

Depression/PP Depression:  No

PTL/PROM:  Yes

Post Partum Hemorrhage:  No

Current Pregnancy Procedures:  Ultrasound; NST

Obstetrical History Comments:  G1 -  - NVSD at 39wks, 7lbs 2oz 

   G2 -  - NVSD at 29wks, IOL Preeclampsia, 2lbs

   G3 -  - NVDS at 37wks, IOL Gestational HTN, 7lbs, Increased

   postpartum bleeding (uterine sweep for clots)

   G4 - Current pregnancy - close interval pregnancy, inadequate PNC,

   HTN 

   

=================================================================

***SEE PRENATAL RECORDS***

=================================================================

   

Alcohol:  No

Marijuana :  No

Cocaine:  No

Other Illicit Drugs:  No

Cigarettes:  Never Smoker. [id number]

   

=================================================================

MEDICAL HISTORY

=================================================================

   

Diabetes:  No

Blood Transfusion:  No

Pulmonary Disease (Asthma, TB):  Yes

Breast Disease:  No

Hypertension:  No

Gyn Surgery:  No

Heart Disease:  No

Hosp/Surgery:  Yes

Autoimmune Disorder:  No

Anesthetic Complications:  No

Kidney Disease:  No

Abnormal Pap Smear:  No

Neuro/Epilepsy:  No

Psychiatric Disorders:  No

Other Medical Diseases:  No

Hepatitis/Liver Disease:  No

Significant Family History:  No

Varicosities/Phlebitis:  No

Trauma/Violence :  No

Thyroid Dysfunction:  No

Medical History Comments:  Hospitalzied for childbirth

   asthma 

   cyst removal from wrist age 12 

   

=================================================================

INFECTIOUS HISTORY

=================================================================

   

Gonorrhea:  No

Genital Herpes:  No

Chlamydia:  No

Tuberculosis:  No

Syphilis:  No

Hepatitis:  Yes

HIV/AIDS Exposure:  No

Rash or Viral Illness:  No

HPV:  No

Infectious History Comments:  hepatitis c positive per previous

   prenatal records but states that HCV RNA quant not detected 

   

=================================================================

PHYSICAL EXAM

=================================================================

   

General:  Normal

General:  Normal

HEENT:  Normal

HEENT:  Normal

Neurologic:  Normal

Neurologic:  Normal

Thyroid:  Normal

Thyroid:  Normal

Heart:  Normal

Heart:  Normal

Lungs:  Normal

Lungs:  Normal

Breast:  Deferred

Breast:  Normal

Back:  Normal

Back:  Normal

Abdomen:  Normal

Abdomen:  Normal

Genitourinary Exam:  Normal

Genitourinary Exam:  Normal

Extremities:  Normal

Extremities:  Normal

DTRs:  Normal

DTRs:  Normal

Pelvic Type:  Adequate

Pelvic Type:  Adequate

Vital Signs:  Reviewed

Vital Signs:  Reviewed

Details Vital Signs:  severe range BPs on admission

Details Vital Signs:  severe range pressures.  

   

=================================================================

VAGINAL EXAM

=================================================================

   

Dilatation:  0

Effacement:  0

Station:  -4

   

=================================================================

FETUS A

=================================================================

   

EGA:  30.5

EGA:  28.1

Monitoring:  External US

Monitoring:  External US

FHR- Baseline:  150

Variability:  Minimal - Undetectable to <=5bpm

Accelerations:  10X10

Decelerations:  None

FHR Category:  Category I

Admit Comment:  25yo  at 30+5 ega by US at 22wks which is c/w

   bedside US done in ER on 3/25/2017 (approx 13wks measuring BPD by

   notes ADEN 2017).  ADEN by 22+4ega US on 2017 is 2017. 

   REviewed BPs in ER and c/w CHTN - Labetolol 100mg BID started last

   visit.  She reports seeing [facility] once but report of appts is unclear

   to pt.  She reports "being transferred to [facility] due to HTN".  She

   never went due to unable to make drive.  She was never sent to [facility]. 

   She reports that she still does not have insurance.  Pt presented

   last evening with feeling poorly, HA, spots in vision and had severe

   range BPs on admission.  Labs unremarkable except for P:C ratio of

   0.3.  Will admit for observation and optimization of Labetolol and

   repeat 24 hr UTP.  Stressed need to obtain prenatal care to patient.

   BPs improved and stable and pt will be moved to floor for continued

   observation.

Admit Comment:  patient has h/o Pre E in G1 and GHTN in G2.   feels

   that her pulse has been elevated for several days.  has had

   intermittent HA for length of pregnancy.  

   GAYLA has transferred her care to [place] but patient has not been

   due to transportation issues.  Has had limitied prenatal care this

   pregnancy.  

   Dating is by a 22 w sono that is c/w her stated ADEN which patient

   indicates was given to her by an ED physician doing a bedside sono

   in the first trimester.  

   Will admit for obs and for ACS protocol.  Will start low dose

   antihypertensives to keep bps in the 150/90s range, will do 24 hour

   urine.  If patient progresses and seems to be in danger of Severe

   PreE and thereby need for delivery will initiate transfer to [facility].  

   

=================================================================

PLANS FOR LABOR AND DELIVERY

=================================================================

   

Labor and Delivery:  None

Pain Management:  Epidural

Feeding Preference:  Breast

Benefit of Breast Feed Discussed:  Yes

Circumcision:  N/A

   

=================================================================

INFORMED CONSENT

=================================================================

   

Informed Consent Obtained:  Risks, Benefits and Alternatives Discussed

Signature:  Electronically signed by Crystal Dailey MD (ISAAC) on

   2017 at 07:09  with User ID: [email or web address]

Signature:  Electronically signed by Margo Jones MD (BRISEIDA) on

   2017 at 15:58  with User ID: Juan [email or web address]

:  Electronically signed by Margo Jones MD (BRISEIDA) on 2017 at

   15:58  with User ID: Juan [email or web address]

## 2017-08-05 LAB
ALBUMIN SERPL-MCNC: 3.2 G/DL (ref 3.5–5)
ALP SERPL-CCNC: 86 U/L (ref 38–126)
ALT SERPL-CCNC: 29 U/L (ref 9–52)
ANION GAP SERPL CALC-SCNC: 9 MMOL/L (ref 5–19)
AST SERPL-CCNC: 20 U/L (ref 14–36)
BASOPHILS # BLD AUTO: 0 10^3/UL (ref 0–0.2)
BASOPHILS NFR BLD AUTO: 0.2 % (ref 0–2)
BILIRUB DIRECT SERPL-MCNC: 0.2 MG/DL (ref 0–0.4)
BILIRUB SERPL-MCNC: 0.5 MG/DL (ref 0.2–1.3)
BUN SERPL-MCNC: 9 MG/DL (ref 7–20)
CALCIUM: 9.4 MG/DL (ref 8.4–10.2)
CHLORIDE SERPL-SCNC: 107 MMOL/L (ref 98–107)
CO2 SERPL-SCNC: 21 MMOL/L (ref 22–30)
CREAT SERPL-MCNC: 0.48 MG/DL (ref 0.52–1.25)
EOSINOPHIL # BLD AUTO: 0 10^3/UL (ref 0–0.6)
EOSINOPHIL NFR BLD AUTO: 0 % (ref 0–6)
ERYTHROCYTE [DISTWIDTH] IN BLOOD BY AUTOMATED COUNT: 14.9 % (ref 11.5–14)
GLUCOSE SERPL-MCNC: 101 MG/DL (ref 75–110)
HCT VFR BLD CALC: 31.9 % (ref 36–47)
HGB BLD-MCNC: 11 G/DL (ref 12–15.5)
HGB HCT DIFFERENCE: 1.1
LDH1 SERPL-CCNC: 449 U/L (ref 313–618)
LYMPHOCYTES # BLD AUTO: 1.2 10^3/UL (ref 0.5–4.7)
LYMPHOCYTES NFR BLD AUTO: 18.9 % (ref 13–45)
MCH RBC QN AUTO: 28.7 PG (ref 27–33.4)
MCHC RBC AUTO-ENTMCNC: 34.5 G/DL (ref 32–36)
MCV RBC AUTO: 83 FL (ref 80–97)
MONOCYTES # BLD AUTO: 0.3 10^3/UL (ref 0.1–1.4)
MONOCYTES NFR BLD AUTO: 4.5 % (ref 3–13)
NEUTROPHILS # BLD AUTO: 5 10^3/UL (ref 1.7–8.2)
NEUTS SEG NFR BLD AUTO: 76.4 % (ref 42–78)
POTASSIUM SERPL-SCNC: 4.8 MMOL/L (ref 3.6–5)
PROT SERPL-MCNC: 6.4 G/DL (ref 6.3–8.2)
RBC # BLD AUTO: 3.83 10^6/UL (ref 3.72–5.28)
SODIUM SERPL-SCNC: 136.6 MMOL/L (ref 137–145)
URATE SERPL-MCNC: 5.7 MG/DL (ref 2.5–6.2)
WBC # BLD AUTO: 6.6 10^3/UL (ref 4–10.5)

## 2017-08-05 RX ADMIN — LABETALOL HYDROCHLORIDE SCH MG: 200 TABLET, FILM COATED ORAL at 09:16

## 2017-08-05 RX ADMIN — LABETALOL HYDROCHLORIDE SCH MG: 200 TABLET, FILM COATED ORAL at 21:58

## 2017-08-05 RX ADMIN — ACETAMINOPHEN PRN MG: 325 TABLET ORAL at 22:56

## 2017-08-05 NOTE — PDOC PROGRESS REPORT
Subjective


Progress Note for:: 17


Subjective:: 


headache resolved.  Now have some cramping.  Labs reviewed and stable.  BPs 

stable at this time.  





Physical Exam





- Physical Exam


Vital Signs: 


 











Temp Pulse Resp BP Pulse Ox


 


 98.5 F   89   18   137/92 H  100 


 


 17 08:36  17 08:36  17 08:36  17 08:36  17 08:36








 Intake & Output











 17





 06:59 06:59 06:59


 


Intake Total  565 


 


Output Total  1550 


 


Balance  -985 


 


Weight  116.15 kg 











General appearance: PRESENT: no acute distress


Head exam: PRESENT: atraumatic


Teeth exam: PRESENT: dental caries


Pulses: PRESENT: normal radial pulses


Vascular exam: PRESENT: normal capillary refill


GI/Abdominal exam: PRESENT: soft - obese, gravid, uterus normal for gestational 

age


Extremities exam: PRESENT: full ROM, pedal edema, +1 edema


Musculoskeletal exam: PRESENT: ambulatory, full ROM


Neurological exam: PRESENT: alert, oriented to person, oriented to place, 

oriented to time





Result


Laboratory Results: 


 





 17 06:02 





 17 06:02 





 











  17





  12:30 15:55 16:13


 


WBC    6.2


 


RBC    4.00


 


Hgb    11.5 L


 


Hct    33.9 L


 


MCV    85


 


MCH    28.7


 


MCHC    33.9


 


RDW    14.8 H


 


Plt Count    256


 


Seg Neutrophils %    51.7


 


Lymphocytes %    38.0


 


Monocytes %    9.0


 


Eosinophils %    0.9


 


Basophils %    0.4


 


Absolute Neutrophils    3.2


 


Absolute Lymphocytes    2.4


 


Absolute Monocytes    0.6


 


Absolute Eosinophils    0.1


 


Absolute Basophils    0.0


 


Sodium   


 


Potassium   


 


Chloride   


 


Carbon Dioxide   


 


Anion Gap   


 


BUN   


 


Creatinine   


 


Est GFR ( Amer)   


 


Est GFR (Non-Af Amer)   


 


Glucose   


 


Uric Acid   


 


Calcium   


 


Total Bilirubin   


 


AST   


 


ALT   


 


Alkaline Phosphatase   


 


Total Protein   


 


Albumin   


 


Urine Color   YELLOW 


 


Urine Appearance   CLOUDY 


 


Urine pH   5.0 


 


Ur Specific Gravity   1.034 


 


Urine Protein   100 H 


 


Urine Glucose (UA)   NEGATIVE 


 


Urine Ketones   TRACE H 


 


Urine Blood   NEGATIVE 


 


Urine Nitrite   NEGATIVE 


 


Ur Leukocyte Esterase   NEGATIVE 


 


Urine WBC (Auto)   3 


 


Urine RBC (Auto)   2 


 


Ur 24 Hour Volume  1150  


 


Ur Total Protein 24 Hr  831 H  














  08/04/17 08/05/17 08/05/17





  16:13 06:02 06:02


 


WBC   6.6 


 


RBC   3.83 


 


Hgb   11.0 L 


 


Hct   31.9 L 


 


MCV   83 


 


MCH   28.7 


 


MCHC   34.5 


 


RDW   14.9 H 


 


Plt Count   256 


 


Seg Neutrophils %   76.4 


 


Lymphocytes %   18.9 


 


Monocytes %   4.5 


 


Eosinophils %   0.0 


 


Basophils %   0.2 


 


Absolute Neutrophils   5.0 


 


Absolute Lymphocytes   1.2 


 


Absolute Monocytes   0.3 


 


Absolute Eosinophils   0.0 


 


Absolute Basophils   0.0 


 


Sodium  136.4 L   136.6 L


 


Potassium  4.8   4.8


 


Chloride  108 H   107


 


Carbon Dioxide  20 L   21 L


 


Anion Gap  8   9


 


BUN  11   9


 


Creatinine  0.57   0.48 L


 


Est GFR ( Amer)  > 60   > 60


 


Est GFR (Non-Af Amer)  > 60   > 60


 


Glucose  79   101


 


Uric Acid  6.0   5.7


 


Calcium  9.5   9.4


 


Total Bilirubin  0.4   0.5


 


AST  24   20


 


ALT  34   29


 


Alkaline Phosphatase  92   86


 


Total Protein  6.7   6.4


 


Albumin  3.4 L   3.2 L


 


Urine Color   


 


Urine Appearance   


 


Urine pH   


 


Ur Specific Gravity   


 


Urine Protein   


 


Urine Glucose (UA)   


 


Urine Ketones   


 


Urine Blood   


 


Urine Nitrite   


 


Ur Leukocyte Esterase   


 


Urine WBC (Auto)   


 


Urine RBC (Auto)   


 


Ur 24 Hour Volume   


 


Ur Total Protein 24 Hr   














Assessment & Plan





- Diagnosis


(1) Gestational hypertension





Is this a current diagnosis for this admission?: Yes





(2) Limited prenatal care





Is this a current diagnosis for this admission?: Yes





(3) Pre-eclampsia in third trimester


Is this a current diagnosis for this admission?: Yes





(4) Essential hypertension affecting pregnancy in third trimester


Is this a current diagnosis for this admission?: Yes








- Inpatient Certification


Based on my medical assessment, after consideration of the patient's 

comorbidities, presenting symptoms, or acuity I expect that the services needed 

warrant INPATIENT care.: Yes


I certify that my determination is in accordance with my understanding of 

Medicare's requirements for reasonable and necessary INPATIENT services [42 CFR 

412.3e].: Yes


Medical Necessity: Failure to Improve With Outpatient Therapy, Need Close 

Monitoring Due to Risk of Patient Decompensation, Risk of Complication if Not 

Cared For in Hospital - I feel that patient's risks for developing sudden onset 

of PreEclampsia with severe features/HELLP/Eclampsia are significant secondary 

to her history and difficulty with compliance of care on outpatient basis.  

recommend continued monitoring in hospital until able to sufficiently reassure 

of likelihood condition stability or worsening of condition making  

delivery necessary.

## 2017-08-06 LAB
ALBUMIN SERPL-MCNC: 2.8 G/DL (ref 3.5–5)
ALP SERPL-CCNC: 72 U/L (ref 38–126)
ALT SERPL-CCNC: 22 U/L (ref 9–52)
ANION GAP SERPL CALC-SCNC: 6 MMOL/L (ref 5–19)
AST SERPL-CCNC: 16 U/L (ref 14–36)
BILIRUB DIRECT SERPL-MCNC: 0.2 MG/DL (ref 0–0.4)
BILIRUB SERPL-MCNC: 0.3 MG/DL (ref 0.2–1.3)
BUN SERPL-MCNC: 12 MG/DL (ref 7–20)
CALCIUM: 8.7 MG/DL (ref 8.4–10.2)
CHLORIDE SERPL-SCNC: 111 MMOL/L (ref 98–107)
CO2 SERPL-SCNC: 22 MMOL/L (ref 22–30)
CREAT SERPL-MCNC: 0.53 MG/DL (ref 0.52–1.25)
ERYTHROCYTE [DISTWIDTH] IN BLOOD BY AUTOMATED COUNT: 15.1 % (ref 11.5–14)
GLUCOSE SERPL-MCNC: 96 MG/DL (ref 75–110)
HCT VFR BLD CALC: 29.2 % (ref 36–47)
HGB BLD-MCNC: 10.1 G/DL (ref 12–15.5)
HGB HCT DIFFERENCE: 1.1
LDH1 SERPL-CCNC: 420 U/L (ref 313–618)
MCH RBC QN AUTO: 29 PG (ref 27–33.4)
MCHC RBC AUTO-ENTMCNC: 34.4 G/DL (ref 32–36)
MCV RBC AUTO: 84 FL (ref 80–97)
POTASSIUM SERPL-SCNC: 4.9 MMOL/L (ref 3.6–5)
PROT SERPL-MCNC: 5.8 G/DL (ref 6.3–8.2)
RBC # BLD AUTO: 3.47 10^6/UL (ref 3.72–5.28)
SODIUM SERPL-SCNC: 138.6 MMOL/L (ref 137–145)
URATE SERPL-MCNC: 5.5 MG/DL (ref 2.5–6.2)
WBC # BLD AUTO: 8.8 10^3/UL (ref 4–10.5)

## 2017-08-06 RX ADMIN — LABETALOL HYDROCHLORIDE SCH MG: 200 TABLET, FILM COATED ORAL at 09:32

## 2017-08-06 RX ADMIN — LABETALOL HYDROCHLORIDE SCH MG: 200 TABLET, FILM COATED ORAL at 17:07

## 2017-08-06 RX ADMIN — ACETAMINOPHEN PRN MG: 325 TABLET ORAL at 16:08

## 2017-08-06 RX ADMIN — ACETAMINOPHEN PRN MG: 325 TABLET ORAL at 09:31

## 2017-08-06 NOTE — PDOC PROGRESS REPORT
Subjective


Progress Note for:: 08/06/17


Subjective:: 


HA improved with fioricet.  denie blurry vision, denies RUQ pain. +FM





Physical Exam





- Physical Exam


Vital Signs: 


 











Temp Pulse Resp BP Pulse Ox


 


 98.7 F   68   16   154/103 H  98 


 


 08/06/17 08:10  08/06/17 08:10  08/06/17 08:10  08/06/17 08:10  08/06/17 08:10








 Intake & Output











 08/05/17 08/06/17 08/07/17





 06:59 06:59 06:59


 


Intake Total 565 960 


 


Output Total 1550 900 


 


Balance -985 60 


 


Weight 116.15 kg  











General appearance: PRESENT: no acute distress


Head exam: PRESENT: atraumatic, normocephalic


Eye exam: ABSENT: scleral icterus


Neck exam: PRESENT: full ROM.  ABSENT: carotid bruit, JVD, lymphadenopathy, 

thyromegaly


Respiratory exam: PRESENT: clear to auscultation teto, symmetrical, unlabored


Cardiovascular exam: PRESENT: bradycardia


Pulses: PRESENT: normal dorsalis pedis pul, +2 pedal pulses bilateral


Vascular exam: PRESENT: normal capillary refill


GI/Abdominal exam: PRESENT: normal bowel sounds, soft.  ABSENT: distended, 

guarding, mass, organolmegaly, rebound, tenderness


Rectal exam: PRESENT: deferred


Extremities exam: PRESENT: full ROM.  ABSENT: calf tenderness, clubbing, pedal 

edema


Musculoskeletal exam: PRESENT: ambulatory


Neurological exam: PRESENT: alert, awake, oriented to person, oriented to place

, oriented to time, oriented to situation, CN II-XII grossly intact.  ABSENT: 

motor sensory deficit


Psychiatric exam: PRESENT: appropriate affect, normal mood.  ABSENT: homicidal 

ideation, suicidal ideation


Skin exam: PRESENT: dry, intact, warm.  ABSENT: cyanosis, rash





Result


Laboratory Results: 


 





 08/06/17 06:04 





 08/06/17 06:04 





 











  08/06/17 08/06/17





  06:04 06:04


 


WBC  8.8 


 


RBC  3.47 L 


 


Hgb  10.1 L 


 


Hct  29.2 L 


 


MCV  84 


 


MCH  29.0 


 


MCHC  34.4 


 


RDW  15.1 H 


 


Plt Count  239 


 


Sodium   138.6


 


Potassium   4.9


 


Chloride   111 H


 


Carbon Dioxide   22


 


Anion Gap   6


 


BUN   12


 


Creatinine   0.53


 


Est GFR ( Amer)   > 60


 


Est GFR (Non-Af Amer)   > 60


 


Glucose   96


 


Uric Acid   5.5


 


Calcium   8.7


 


Total Bilirubin   0.3


 


AST   16


 


ALT   22


 


Alkaline Phosphatase   72


 


Total Protein   5.8 L


 


Albumin   2.8 L











Status: Imported from PACS





Assessment & Plan





- Diagnosis


(1) Essential hypertension affecting pregnancy in third trimester


Is this a current diagnosis for this admission?: YesPlan: 








Continue Labetolol.  Currently on 200mg po BID  Will increase to 200mg po TID.  

May need to add procardia.








(2) Pre-eclampsia in third trimester


Is this a current diagnosis for this admission?: YesPlan: 


Currently asymtomatic.  Deliver if severe features.  May need to be admitted 

until delivery. Will try to adjust BP meds.  Increase labetolol - may need to 

add procardia.











- Time


Time Spent with patient: 15-24 minutes


Critical Time spent with patient: Less than 15 minutes


Medications reviewed and adjusted accordingly: Yes


Anticipated discharge: Home


Within: Other - when stable

## 2017-08-07 LAB
ALBUMIN SERPL-MCNC: 3.1 G/DL (ref 3.5–5)
ALP SERPL-CCNC: 79 U/L (ref 38–126)
ALT SERPL-CCNC: 21 U/L (ref 9–52)
ANION GAP SERPL CALC-SCNC: 10 MMOL/L (ref 5–19)
AST SERPL-CCNC: 18 U/L (ref 14–36)
BILIRUB DIRECT SERPL-MCNC: 0.3 MG/DL (ref 0–0.4)
BILIRUB SERPL-MCNC: 0.3 MG/DL (ref 0.2–1.3)
BUN SERPL-MCNC: 12 MG/DL (ref 7–20)
CALCIUM: 9.3 MG/DL (ref 8.4–10.2)
CHLORIDE SERPL-SCNC: 108 MMOL/L (ref 98–107)
CO2 SERPL-SCNC: 21 MMOL/L (ref 22–30)
CREAT SERPL-MCNC: 0.5 MG/DL (ref 0.52–1.25)
ERYTHROCYTE [DISTWIDTH] IN BLOOD BY AUTOMATED COUNT: 14.7 % (ref 11.5–14)
GLUCOSE SERPL-MCNC: 94 MG/DL (ref 75–110)
HCT VFR BLD CALC: 32.6 % (ref 36–47)
HGB BLD-MCNC: 11 G/DL (ref 12–15.5)
HGB HCT DIFFERENCE: 0.4
LDH1 SERPL-CCNC: 536 U/L (ref 313–618)
MCH RBC QN AUTO: 28.8 PG (ref 27–33.4)
MCHC RBC AUTO-ENTMCNC: 33.7 G/DL (ref 32–36)
MCV RBC AUTO: 86 FL (ref 80–97)
POTASSIUM SERPL-SCNC: 4.3 MMOL/L (ref 3.6–5)
PROT SERPL-MCNC: 6.4 G/DL (ref 6.3–8.2)
RBC # BLD AUTO: 3.81 10^6/UL (ref 3.72–5.28)
SODIUM SERPL-SCNC: 138.6 MMOL/L (ref 137–145)
URATE SERPL-MCNC: 5.3 MG/DL (ref 2.5–6.2)
WBC # BLD AUTO: 7.9 10^3/UL (ref 4–10.5)

## 2017-08-07 RX ADMIN — SODIUM CHLORIDE, SODIUM LACTATE, POTASSIUM CHLORIDE, AND CALCIUM CHLORIDE PRN ML: .6; .31; .03; .02 INJECTION, SOLUTION INTRAVENOUS at 18:54

## 2017-08-07 RX ADMIN — ACETAMINOPHEN PRN MG: 325 TABLET ORAL at 02:33

## 2017-08-07 RX ADMIN — LABETALOL HYDROCHLORIDE SCH MG: 200 TABLET, FILM COATED ORAL at 09:00

## 2017-08-07 RX ADMIN — ACETAMINOPHEN PRN MG: 325 TABLET ORAL at 11:02

## 2017-08-07 RX ADMIN — ACETAMINOPHEN PRN MG: 325 TABLET ORAL at 18:58

## 2017-08-07 RX ADMIN — PENICILLIN G POTASSIUM SCH UNIT: 5000000 POWDER, FOR SOLUTION INTRAMUSCULAR; INTRAPLEURAL; INTRATHECAL; INTRAVENOUS at 18:55

## 2017-08-07 RX ADMIN — LABETALOL HYDROCHLORIDE SCH MG: 200 TABLET, FILM COATED ORAL at 02:22

## 2017-08-07 RX ADMIN — LABETALOL HYDROCHLORIDE SCH MG: 200 TABLET, FILM COATED ORAL at 17:31

## 2017-08-07 RX ADMIN — PENICILLIN G POTASSIUM SCH UNIT: 5000000 POWDER, FOR SOLUTION INTRAMUSCULAR; INTRAPLEURAL; INTRATHECAL; INTRAVENOUS at 23:21

## 2017-08-07 NOTE — PDOC PROGRESS REPORT
Subjective


Progress Note for:: 08/07/17


Subjective:: 


Pt notes some visual changes as well as mild left sided headache whcih she 

attributes to tooth pain. 








Physical Exam





- Physical Exam


Vital Signs: 


 











Temp Pulse Resp BP Pulse Ox


 


 98.1 F   73   18   162/96 H  99 


 


 08/07/17 08:22  08/07/17 08:22  08/07/17 08:22  08/07/17 08:22  08/07/17 08:22








 Intake & Output











 08/06/17 08/07/17 08/08/17





 06:59 06:59 06:59


 


Intake Total 960 3540 


 


Output Total 900 2655 


 


Balance 60 885 











General appearance: PRESENT: no acute distress


GI/Abdominal exam: PRESENT: normal bowel sounds, soft.  ABSENT: distended, 

guarding, mass, organolmegaly, rebound, tenderness


Gentrourinary exam: PRESENT: other - sve-1-2/50/highsoft/mid





Result


Laboratory Results: 


 





 08/06/17 06:04 





 08/06/17 06:04 











Assessment & Plan





- Diagnosis


(1) Essential hypertension affecting pregnancy in third trimester


Is this a current diagnosis for this admission?: Yes





(2) Pre-eclampsia in third trimester


Is this a current diagnosis for this admission?: Yes








- Plan Summary


Plan Summary: 


pt now with completed steroids. Had severe range bp this am despite increasing 

bp meds yesterday and visual changes as well as mild HA. Given superimposed 

preeclampsia will move toward delivery-transfer to [facility] for induction, gbs 

prophylaxis, magnesium in labor. Recheck preeclampsia labs today.

## 2017-08-08 LAB
ALBUMIN SERPL-MCNC: 3.2 G/DL (ref 3.5–5)
ALBUMIN SERPL-MCNC: 3.2 G/DL (ref 3.5–5)
ALP SERPL-CCNC: 92 U/L (ref 38–126)
ALP SERPL-CCNC: 96 U/L (ref 38–126)
ALT SERPL-CCNC: 23 U/L (ref 9–52)
ALT SERPL-CCNC: 26 U/L (ref 9–52)
ANION GAP SERPL CALC-SCNC: 8 MMOL/L (ref 5–19)
ANION GAP SERPL CALC-SCNC: 9 MMOL/L (ref 5–19)
AST SERPL-CCNC: 18 U/L (ref 14–36)
AST SERPL-CCNC: 19 U/L (ref 14–36)
BASOPHILS # BLD AUTO: 0 10^3/UL (ref 0–0.2)
BASOPHILS # BLD AUTO: 0 10^3/UL (ref 0–0.2)
BASOPHILS NFR BLD AUTO: 0.3 % (ref 0–2)
BASOPHILS NFR BLD AUTO: 0.4 % (ref 0–2)
BILIRUB DIRECT SERPL-MCNC: 0.3 MG/DL (ref 0–0.4)
BILIRUB DIRECT SERPL-MCNC: 0.3 MG/DL (ref 0–0.4)
BILIRUB SERPL-MCNC: 0.4 MG/DL (ref 0.2–1.3)
BILIRUB SERPL-MCNC: 0.4 MG/DL (ref 0.2–1.3)
BUN SERPL-MCNC: 10 MG/DL (ref 7–20)
BUN SERPL-MCNC: 14 MG/DL (ref 7–20)
CALCIUM: 8.2 MG/DL (ref 8.4–10.2)
CALCIUM: 8.4 MG/DL (ref 8.4–10.2)
CHLORIDE SERPL-SCNC: 103 MMOL/L (ref 98–107)
CHLORIDE SERPL-SCNC: 105 MMOL/L (ref 98–107)
CO2 SERPL-SCNC: 21 MMOL/L (ref 22–30)
CO2 SERPL-SCNC: 22 MMOL/L (ref 22–30)
CREAT SERPL-MCNC: 0.56 MG/DL (ref 0.52–1.25)
CREAT SERPL-MCNC: 0.68 MG/DL (ref 0.52–1.25)
EOSINOPHIL # BLD AUTO: 0.1 10^3/UL (ref 0–0.6)
EOSINOPHIL # BLD AUTO: 0.1 10^3/UL (ref 0–0.6)
EOSINOPHIL NFR BLD AUTO: 0.7 % (ref 0–6)
EOSINOPHIL NFR BLD AUTO: 0.7 % (ref 0–6)
ERYTHROCYTE [DISTWIDTH] IN BLOOD BY AUTOMATED COUNT: 14.8 % (ref 11.5–14)
ERYTHROCYTE [DISTWIDTH] IN BLOOD BY AUTOMATED COUNT: 15 % (ref 11.5–14)
GLUCOSE SERPL-MCNC: 81 MG/DL (ref 75–110)
GLUCOSE SERPL-MCNC: 96 MG/DL (ref 75–110)
HCT VFR BLD CALC: 32.3 % (ref 36–47)
HCT VFR BLD CALC: 33.2 % (ref 36–47)
HGB BLD-MCNC: 11.2 G/DL (ref 12–15.5)
HGB BLD-MCNC: 11.3 G/DL (ref 12–15.5)
HGB HCT DIFFERENCE: 0.7
HGB HCT DIFFERENCE: 1.3
LDH1 SERPL-CCNC: 483 U/L (ref 313–618)
LDH1 SERPL-CCNC: 546 U/L (ref 313–618)
LYMPHOCYTES # BLD AUTO: 2 10^3/UL (ref 0.5–4.7)
LYMPHOCYTES # BLD AUTO: 2.1 10^3/UL (ref 0.5–4.7)
LYMPHOCYTES NFR BLD AUTO: 24 % (ref 13–45)
LYMPHOCYTES NFR BLD AUTO: 26.4 % (ref 13–45)
MCH RBC QN AUTO: 28.8 PG (ref 27–33.4)
MCH RBC QN AUTO: 28.9 PG (ref 27–33.4)
MCHC RBC AUTO-ENTMCNC: 34.1 G/DL (ref 32–36)
MCHC RBC AUTO-ENTMCNC: 34.5 G/DL (ref 32–36)
MCV RBC AUTO: 84 FL (ref 80–97)
MCV RBC AUTO: 84 FL (ref 80–97)
MONOCYTES # BLD AUTO: 0.7 10^3/UL (ref 0.1–1.4)
MONOCYTES # BLD AUTO: 0.8 10^3/UL (ref 0.1–1.4)
MONOCYTES NFR BLD AUTO: 10.3 % (ref 3–13)
MONOCYTES NFR BLD AUTO: 8.4 % (ref 3–13)
NEUTROPHILS # BLD AUTO: 4.7 10^3/UL (ref 1.7–8.2)
NEUTROPHILS # BLD AUTO: 5.7 10^3/UL (ref 1.7–8.2)
NEUTS SEG NFR BLD AUTO: 62.3 % (ref 42–78)
NEUTS SEG NFR BLD AUTO: 66.5 % (ref 42–78)
POTASSIUM SERPL-SCNC: 4.4 MMOL/L (ref 3.6–5)
POTASSIUM SERPL-SCNC: 4.8 MMOL/L (ref 3.6–5)
PROT SERPL-MCNC: 6.4 G/DL (ref 6.3–8.2)
PROT SERPL-MCNC: 6.6 G/DL (ref 6.3–8.2)
RBC # BLD AUTO: 3.86 10^6/UL (ref 3.72–5.28)
RBC # BLD AUTO: 3.94 10^6/UL (ref 3.72–5.28)
SODIUM SERPL-SCNC: 134.3 MMOL/L (ref 137–145)
SODIUM SERPL-SCNC: 134.4 MMOL/L (ref 137–145)
URATE SERPL-MCNC: 5.6 MG/DL (ref 2.5–6.2)
URATE SERPL-MCNC: 6.4 MG/DL (ref 2.5–6.2)
WBC # BLD AUTO: 7.5 10^3/UL (ref 4–10.5)
WBC # BLD AUTO: 8.5 10^3/UL (ref 4–10.5)

## 2017-08-08 RX ADMIN — MAGNESIUM SULFATE IN WATER PRN ML: 40 INJECTION, SOLUTION INTRAVENOUS at 11:39

## 2017-08-08 RX ADMIN — PENICILLIN G POTASSIUM SCH UNIT: 5000000 POWDER, FOR SOLUTION INTRAMUSCULAR; INTRAPLEURAL; INTRATHECAL; INTRAVENOUS at 07:11

## 2017-08-08 RX ADMIN — ZOLPIDEM TARTRATE SCH MG: 5 TABLET ORAL at 21:16

## 2017-08-08 RX ADMIN — LABETALOL HYDROCHLORIDE SCH MG: 200 TABLET, FILM COATED ORAL at 09:10

## 2017-08-08 RX ADMIN — ACETAMINOPHEN PRN MG: 325 TABLET ORAL at 09:00

## 2017-08-08 RX ADMIN — PENICILLIN G POTASSIUM SCH UNIT: 5000000 POWDER, FOR SOLUTION INTRAMUSCULAR; INTRAPLEURAL; INTRATHECAL; INTRAVENOUS at 11:11

## 2017-08-08 RX ADMIN — PENICILLIN G POTASSIUM SCH UNIT: 5000000 POWDER, FOR SOLUTION INTRAMUSCULAR; INTRAPLEURAL; INTRATHECAL; INTRAVENOUS at 03:37

## 2017-08-08 RX ADMIN — LABETALOL HYDROCHLORIDE SCH MG: 200 TABLET, FILM COATED ORAL at 01:27

## 2017-08-08 RX ADMIN — MAGNESIUM SULFATE IN WATER PRN ML: 40 INJECTION, SOLUTION INTRAVENOUS at 20:16

## 2017-08-08 RX ADMIN — MAGNESIUM SULFATE IN WATER PRN ML: 40 INJECTION, SOLUTION INTRAVENOUS at 01:40

## 2017-08-08 RX ADMIN — ACETAMINOPHEN PRN MG: 325 TABLET ORAL at 21:15

## 2017-08-08 RX ADMIN — ACETAMINOPHEN PRN MG: 325 TABLET ORAL at 17:11

## 2017-08-08 RX ADMIN — LABETALOL HYDROCHLORIDE SCH MG: 200 TABLET, FILM COATED ORAL at 17:12

## 2017-08-09 LAB
ALBUMIN SERPL-MCNC: 3.2 G/DL (ref 3.5–5)
ALP SERPL-CCNC: 90 U/L (ref 38–126)
ALT SERPL-CCNC: 26 U/L (ref 9–52)
ANION GAP SERPL CALC-SCNC: 9 MMOL/L (ref 5–19)
AST SERPL-CCNC: 17 U/L (ref 14–36)
BASOPHILS # BLD AUTO: 0 10^3/UL (ref 0–0.2)
BASOPHILS # BLD AUTO: 0 10^3/UL (ref 0–0.2)
BASOPHILS NFR BLD AUTO: 0.5 % (ref 0–2)
BASOPHILS NFR BLD AUTO: 0.5 % (ref 0–2)
BILIRUB DIRECT SERPL-MCNC: 0.3 MG/DL (ref 0–0.4)
BILIRUB SERPL-MCNC: 0.4 MG/DL (ref 0.2–1.3)
BUN SERPL-MCNC: 13 MG/DL (ref 7–20)
CALCIUM: 8.4 MG/DL (ref 8.4–10.2)
CHLORIDE SERPL-SCNC: 105 MMOL/L (ref 98–107)
CO2 SERPL-SCNC: 21 MMOL/L (ref 22–30)
CREAT SERPL-MCNC: 0.6 MG/DL (ref 0.52–1.25)
EOSINOPHIL # BLD AUTO: 0 10^3/UL (ref 0–0.6)
EOSINOPHIL # BLD AUTO: 0 10^3/UL (ref 0–0.6)
EOSINOPHIL NFR BLD AUTO: 0.5 % (ref 0–6)
EOSINOPHIL NFR BLD AUTO: 0.7 % (ref 0–6)
ERYTHROCYTE [DISTWIDTH] IN BLOOD BY AUTOMATED COUNT: 14.8 % (ref 11.5–14)
ERYTHROCYTE [DISTWIDTH] IN BLOOD BY AUTOMATED COUNT: 14.9 % (ref 11.5–14)
GLUCOSE SERPL-MCNC: 90 MG/DL (ref 75–110)
HCT VFR BLD CALC: 32.3 % (ref 36–47)
HCT VFR BLD CALC: 32.4 % (ref 36–47)
HGB BLD-MCNC: 11.1 G/DL (ref 12–15.5)
HGB BLD-MCNC: 11.1 G/DL (ref 12–15.5)
HGB HCT DIFFERENCE: 0.9
HGB HCT DIFFERENCE: 1
LDH1 SERPL-CCNC: 484 U/L (ref 313–618)
LYMPHOCYTES # BLD AUTO: 2.2 10^3/UL (ref 0.5–4.7)
LYMPHOCYTES # BLD AUTO: 2.3 10^3/UL (ref 0.5–4.7)
LYMPHOCYTES NFR BLD AUTO: 29.5 % (ref 13–45)
LYMPHOCYTES NFR BLD AUTO: 33.8 % (ref 13–45)
MCH RBC QN AUTO: 28.5 PG (ref 27–33.4)
MCH RBC QN AUTO: 28.5 PG (ref 27–33.4)
MCHC RBC AUTO-ENTMCNC: 34.2 G/DL (ref 32–36)
MCHC RBC AUTO-ENTMCNC: 34.3 G/DL (ref 32–36)
MCV RBC AUTO: 83 FL (ref 80–97)
MCV RBC AUTO: 83 FL (ref 80–97)
MONOCYTES # BLD AUTO: 0.5 10^3/UL (ref 0.1–1.4)
MONOCYTES # BLD AUTO: 0.6 10^3/UL (ref 0.1–1.4)
MONOCYTES NFR BLD AUTO: 7.6 % (ref 3–13)
MONOCYTES NFR BLD AUTO: 7.9 % (ref 3–13)
NEUTROPHILS # BLD AUTO: 3.8 10^3/UL (ref 1.7–8.2)
NEUTROPHILS # BLD AUTO: 4.8 10^3/UL (ref 1.7–8.2)
NEUTS SEG NFR BLD AUTO: 57.4 % (ref 42–78)
NEUTS SEG NFR BLD AUTO: 61.6 % (ref 42–78)
POTASSIUM SERPL-SCNC: 4.6 MMOL/L (ref 3.6–5)
PROT SERPL-MCNC: 6.4 G/DL (ref 6.3–8.2)
RBC # BLD AUTO: 3.88 10^6/UL (ref 3.72–5.28)
RBC # BLD AUTO: 3.9 10^6/UL (ref 3.72–5.28)
SODIUM SERPL-SCNC: 135.3 MMOL/L (ref 137–145)
URATE SERPL-MCNC: 6.6 MG/DL (ref 2.5–6.2)
WBC # BLD AUTO: 6.6 10^3/UL (ref 4–10.5)
WBC # BLD AUTO: 7.9 10^3/UL (ref 4–10.5)

## 2017-08-09 PROCEDURE — 10907ZC DRAINAGE OF AMNIOTIC FLUID, THERAPEUTIC FROM PRODUCTS OF CONCEPTION, VIA NATURAL OR ARTIFICIAL OPENING: ICD-10-PCS | Performed by: OBSTETRICS & GYNECOLOGY

## 2017-08-09 RX ADMIN — SODIUM CHLORIDE, SODIUM LACTATE, POTASSIUM CHLORIDE, AND CALCIUM CHLORIDE PRN ML: .6; .31; .03; .02 INJECTION, SOLUTION INTRAVENOUS at 06:41

## 2017-08-09 RX ADMIN — LABETALOL HYDROCHLORIDE SCH MG: 200 TABLET, FILM COATED ORAL at 08:51

## 2017-08-09 RX ADMIN — MAGNESIUM SULFATE IN WATER PRN ML: 40 INJECTION, SOLUTION INTRAVENOUS at 06:40

## 2017-08-09 RX ADMIN — MAGNESIUM SULFATE IN WATER PRN ML: 40 INJECTION, SOLUTION INTRAVENOUS at 17:42

## 2017-08-09 RX ADMIN — LABETALOL HYDROCHLORIDE SCH MG: 200 TABLET, FILM COATED ORAL at 01:11

## 2017-08-09 RX ADMIN — IBUPROFEN SCH MG: 800 TABLET, FILM COATED ORAL at 20:51

## 2017-08-09 RX ADMIN — LABETALOL HYDROCHLORIDE SCH MG: 200 TABLET, FILM COATED ORAL at 17:40

## 2017-08-09 NOTE — ADMISSION PHYSICAL
=================================================================



=================================================================

Datetime Report Generated by CPN: 2017 23:13

   

   

=================================================================

CURRENT ADMISSION

=================================================================

   

Chief Complaint:  Signs/Symptoms Gestational HTN

Chief Complaint:  Signs/Symptoms Gestational HTN

Chief Complaint:  Other

Chief Complaint Other:  elevated pulse

Indication for Induction:  Not Applicable

Admit Plan:  Admit to Unit; Observation/Evaluation

Admit Plan:  Admit to Unit; Observation/Evaluation

Admit Plan:  Admit to Unit

   

=================================================================

ALLERGIES

=================================================================

   

Medication Allergies:  No

Medication Allergies:  No Known Allergies (2017)

Latex:  No Latex Allergies

Food Allergies:  None

Environmental Allergies:  None

   

=================================================================

OBSTETRICAL HISTORY

=================================================================

   

EDC:  2017 00:00

:  4

Para:  3

Term:  2

:  1

SAB:  0

IAB:  0

Ectopic:  0

Living:  3

Cesareans:  0

VBACs:  0

Multiple Births:  0

Gestational Diabetes:  No

Rh Sensitization:  No

Incompetent Cervix:  No

ANGELA:  No

Infertility:  No

ART Treatment:  No

Uterine Anomaly:  No

IUGR:  No

Hx Previous C/S:  No

Macrosomia:  No

Hx Loss/Stillborn:  No

PIH:  Yes

Hx  Death:  No

Placenta Previa/Abruption:  No

Depression/PP Depression:  No

PTL/PROM:  Yes

Post Partum Hemorrhage:  No

Current Pregnancy Procedures:  Ultrasound; NST

Obstetrical History Comments:  G1 -  - NVSD at 39wks, 7lbs 2oz 

   G2 -  - NVSD at 29wks, IOL Preeclampsia, 2lbs

   G3 - 2016 - NVDS at 37wks, IOL Gestational HTN, 7lbs, Increased

   postpartum bleeding (uterine sweep for clots)

   G4 - Current pregnancy - close interval pregnancy, inadequate PNC,

   HTN 

   

=================================================================

***SEE PRENATAL RECORDS***

=================================================================

   

Alcohol:  No

Marijuana :  No

Cocaine:  No

Other Illicit Drugs:  No

Cigarettes:  Never Smoker. [id number]

   

=================================================================

MEDICAL HISTORY

=================================================================

   

Diabetes:  No

Blood Transfusion:  No

Pulmonary Disease (Asthma, TB):  Yes

Breast Disease:  No

Hypertension:  No

Gyn Surgery:  No

Heart Disease:  No

Hosp/Surgery:  Yes

Autoimmune Disorder:  No

Anesthetic Complications:  No

Kidney Disease:  No

Abnormal Pap Smear:  No

Neuro/Epilepsy:  No

Psychiatric Disorders:  No

Other Medical Diseases:  No

Hepatitis/Liver Disease:  No

Significant Family History:  No

Varicosities/Phlebitis:  No

Trauma/Violence :  No

Thyroid Dysfunction:  No

Medical History Comments:  Hospitalzied for childbirth

   asthma 

   cyst removal from wrist age 12 

   

=================================================================

INFECTIOUS HISTORY

=================================================================

   

Gonorrhea:  No

Genital Herpes:  No

Chlamydia:  No

Tuberculosis:  No

Syphilis:  No

Hepatitis:  Yes

HIV/AIDS Exposure:  No

Rash or Viral Illness:  No

HPV:  No

Infectious History Comments:  hepatitis c positive per previous

   prenatal records but states that HCV RNA quant not detected 

   

=================================================================

PHYSICAL EXAM

=================================================================

   

General:  Normal

HEENT:  Normal

Neurologic:  Normal

Thyroid:  Normal

Heart:  Normal

Lungs:  Normal

Breast:  Deferred

Breast:  Deferred

Breast:  Normal

Back:  Normal

Abdomen:  Normal

Genitourinary Exam:  Normal

Extremities:  Normal

DTRs:  Normal

Pelvic Type:  Adequate

Vital Signs:  Reviewed

Details Vital Signs:  severe range BPs on admission

Details Vital Signs:  severe range BPs on admission

Details Vital Signs:  severe range pressures.  

   

=================================================================

VAGINAL EXAM

=================================================================

   

Dilatation:  2-3

Dilatation:  0

Dilatation:  0

Effacement:  50

Effacement:  0

Effacement:  0

Station:  -2

Station:  -3

Station:  -4

   

=================================================================

MEMBRANES

=================================================================

   

Membranes:  Ruptured

Amniotic Fluid Color:  Clear

   

=================================================================

FETUS A

=================================================================

   

EGA:  31.6

EGA:  30.5

EGA:  28.1

Monitoring:  External US

FHR- Baseline:  135

FHR- Baseline:  150

Variability:  Moderate 6-25bpm

Variability:  Minimal - Undetectable to <=5bpm

Accelerations:  15X15

Accelerations:  10X10

Decelerations:  None

Decelerations:  None

FHR Category:  Category I

FHR Category:  Category I

Fetal Presentation:  Vertex

Admit Comment:  27yo  at 31+6 ega by US at 22wks which is c/w

   bedside US done in ER on 3/25/2017 (approx 13wks measuring BPD by

   notes ADEN 2017).  ADEN by 22+4ega US on 2017 is 2017. 

   REviewed BPs in ER and c/w CHTN - Labetolol 100mg BID started last

   visit.  She reports seeing [facility] once but report of appts is unclear

   to pt - all prenatal labs were drawn on admission and pt began

   prenatal care at [facility] this week.  She reports HA today and blurry

   vision but none since presenting here on L_D.  She had mild range

   BPs then has had several severe range BPs - hydralazine given with

   good response.  Labs unremarkable except for P:C ratio of 0.4 and

   24hr UTP of 830mg.  Will admit for observation and optimization of

   Labetolol and repeat BMZ (after d/w CLEO Connor).  If evidence of

   severe PreE by symptoms or BPs or e/o oliguria then may need to

   deliver.  EFW 1911g (47%) and normal ORTIZ.

Admit Comment:  27yo  at 30+5 ega by US at 22wks which is c/w

   bedside US done in ER on 3/25/2017 (approx 13wks measuring BPD by

   notes ADEN 2017).  ADEN by 22+4ega US on 2017 is 2017. 

   REviewed BPs in ER and c/w CHTN - Labetolol 100mg BID started last

   visit.  She reports seeing [facility] once but report of appts is unclear

   to pt.  She reports "being transferred to [facility] due to HTN".  She

   never went due to unable to make drive.  She was never sent to [facility]. 

   She reports that she still does not have insurance.  Pt presented

   last evening with feeling poorly, HA, spots in vision and had severe

   range BPs on admission.  Labs unremarkable except for P:C ratio of

   0.3.  Will admit for observation and optimization of Labetolol and

   repeat 24 hr UTP.  Stressed need to obtain prenatal care to patient.

   BPs improved and stable and pt will be moved to floor for continued

   observation.

Admit Comment:  patient has h/o Pre E in G1 and GHTN in G2.   feels

   that her pulse has been elevated for several days.  has had

   intermittent HA for length of pregnancy.  

   GAYLA has transferred her care to [place] but patient has not been

   due to transportation issues.  Has had limitied prenatal care this

   pregnancy.  

   Dating is by a 22 w sono that is c/w her stated ADEN which patient

   indicates was given to her by an ED physician doing a bedside sono

   in the first trimester.  

   Will admit for obs and for ACS protocol.  Will start low dose

   antihypertensives to keep bps in the 150/90s range, will do 24 hour

   urine.  If patient progresses and seems to be in danger of Severe

   PreE and thereby need for delivery will initiate transfer to [facility].  

   

=================================================================

PLANS FOR LABOR AND DELIVERY

=================================================================

   

Labor and Delivery:  None

Pain Management:  Epidural

Feeding Preference:  Both

Benefit of Breast Feed Discussed:  Yes

Circumcision:  N/A

   

=================================================================

INFORMED CONSENT

=================================================================

   

Informed Consent Obtained:  Vaginal Delivery; Risks, Benefits and

   Alternatives Discussed

Informed Consent Obtained:  Risks, Benefits and Alternatives Discussed

Signature:  Electronically signed by Crystal Dailey MD (ISAAC) on

   2017 at 20:46  with User ID: [email or web address]

Signature:  Electronically signed by Crystal Dailey MD (ISAAC) on

   2017 at 07:09  with User ID: [email or web address]

Signature:  Electronically signed by Margo Jones MD (BRISEIDA) on

   2017 at 15:58  with User ID: [email or web address]

:  Electronically signed by Crystal Dailey MD (ISAAC) on 2017 at

   07:09  with User ID: [email or web address]

:  Electronically signed by Margo Jones MD (ANDDO) on 2017 at

   15:58  with User ID: Juan [email or web address]

:  I personally evaluated and examined the patient in conjunction with

   the MLP and agree with the assessment, treatment plan and

   disposition.

:  I personally evaluated and examined the patient in conjunction with

   the MLP and agree with the assessment, treatment plan and

   disposition.

## 2017-08-09 NOTE — L&D PROGRESS NOTES
=================================================================

PROGRESS NOTES

=================================================================

Datetime Report Generated by CPN: 08/09/2017 14:11

   

   

=================================================================

PROGRESS NOTE

=================================================================

   

Impression:  Reassuring Fetal Heart Rate

Procedures:  Artificial ROM; Sterile Vag Exam

Plan:  Continue Present Management

Informed Consent Obtained:  Vaginal Delivery; Risks, Benefits and

   Alternatives Discussed

Informed Consent Obtained:  Risks, Benefits and Alternatives Discussed

Vital Signs :  Reviewed

Comment:  SVE and AROM w clear fluid.  Cervix still has texture-not

   real soft yet.  Will continue with induction w Pitocin.

   

=================================================================

VAGINAL EXAM

=================================================================

   

Dilatation:  2-3

Dilatation:  0

Dilatation:  0

Effacement:  50

Effacement:  0

Effacement:  0

Station:  -2

Station:  -3

Station:  -4

   

=================================================================

MEMBRANES

=================================================================

   

Membranes:  Ruptured

Amniotic Fluid Color:  Clear

   

=================================================================

FETUS A

=================================================================

   

FHR - Baseline:  124

Monitoring:  External US

Variability:  Moderate 6-25bpm

Accelerations:  15X15

Decelerations:  None

FHR Category:  Category I

:  32.4

:  29.3

Fetal Presentation:  Vertex

   

=================================================================

SIGNATURE

=================================================================

   

SIGNATURE:  [id number]

Assignment:  Margo Jones MD

Signature:  Electronically signed by Eloisa Valencia CNM on 8/9/2017 at

   14:11  with User ID: [email or web address]

:  Electronically signed by Eloisa Valencia CNM on 8/9/2017 at 14:11  with

   User ID: [email or web address]

:  I personally evaluated and examined the patient in conjunction with

   the MLP and agree with the assessment, treatment plan and

   disposition.

## 2017-08-10 LAB
ALBUMIN SERPL-MCNC: 2.7 G/DL (ref 3.5–5)
ALP SERPL-CCNC: 72 U/L (ref 38–126)
ALT SERPL-CCNC: 24 U/L (ref 9–52)
ANION GAP SERPL CALC-SCNC: 7 MMOL/L (ref 5–19)
AST SERPL-CCNC: 19 U/L (ref 14–36)
BILIRUB DIRECT SERPL-MCNC: 0.3 MG/DL (ref 0–0.4)
BILIRUB SERPL-MCNC: 0.3 MG/DL (ref 0.2–1.3)
BUN SERPL-MCNC: 18 MG/DL (ref 7–20)
CALCIUM: 8.9 MG/DL (ref 8.4–10.2)
CHLORIDE SERPL-SCNC: 105 MMOL/L (ref 98–107)
CO2 SERPL-SCNC: 22 MMOL/L (ref 22–30)
CREAT SERPL-MCNC: 0.63 MG/DL (ref 0.52–1.25)
ERYTHROCYTE [DISTWIDTH] IN BLOOD BY AUTOMATED COUNT: 14.7 % (ref 11.5–14)
GLUCOSE SERPL-MCNC: 79 MG/DL (ref 75–110)
HCT VFR BLD CALC: 30 % (ref 36–47)
HGB BLD-MCNC: 10.4 G/DL (ref 12–15.5)
HGB HCT DIFFERENCE: 1.2
LDH1 SERPL-CCNC: 580 U/L (ref 313–618)
MCH RBC QN AUTO: 29 PG (ref 27–33.4)
MCHC RBC AUTO-ENTMCNC: 34.7 G/DL (ref 32–36)
MCV RBC AUTO: 84 FL (ref 80–97)
POTASSIUM SERPL-SCNC: 4.8 MMOL/L (ref 3.6–5)
PROT SERPL-MCNC: 5.8 G/DL (ref 6.3–8.2)
RBC # BLD AUTO: 3.58 10^6/UL (ref 3.72–5.28)
SODIUM SERPL-SCNC: 133.6 MMOL/L (ref 137–145)
URATE SERPL-MCNC: 6.5 MG/DL (ref 2.5–6.2)
WBC # BLD AUTO: 8.9 10^3/UL (ref 4–10.5)

## 2017-08-10 RX ADMIN — FERROUS SULFATE TAB 325 MG (65 MG ELEMENTAL FE) SCH MG: 325 (65 FE) TAB at 00:07

## 2017-08-10 RX ADMIN — FAMOTIDINE SCH MG: 20 TABLET, FILM COATED ORAL at 22:29

## 2017-08-10 RX ADMIN — LABETALOL HYDROCHLORIDE SCH MG: 200 TABLET, FILM COATED ORAL at 09:09

## 2017-08-10 RX ADMIN — DOCUSATE SODIUM SCH MG: 100 CAPSULE, LIQUID FILLED ORAL at 00:07

## 2017-08-10 RX ADMIN — ZOLPIDEM TARTRATE SCH MG: 5 TABLET ORAL at 00:07

## 2017-08-10 RX ADMIN — PRENATAL W/O A VIT W/ FE FUMARATE-FA CAP 106.5-1 MG SCH CAP: 106.5 CAPSULE ORAL at 09:10

## 2017-08-10 RX ADMIN — DOCUSATE SODIUM SCH MG: 100 CAPSULE, LIQUID FILLED ORAL at 09:10

## 2017-08-10 RX ADMIN — IBUPROFEN SCH MG: 800 TABLET, FILM COATED ORAL at 13:27

## 2017-08-10 RX ADMIN — LABETALOL HYDROCHLORIDE SCH MG: 200 TABLET, FILM COATED ORAL at 01:59

## 2017-08-10 RX ADMIN — FERROUS SULFATE TAB 325 MG (65 MG ELEMENTAL FE) SCH MG: 325 (65 FE) TAB at 09:10

## 2017-08-10 RX ADMIN — FAMOTIDINE SCH MG: 20 TABLET, FILM COATED ORAL at 09:11

## 2017-08-10 RX ADMIN — FERROUS SULFATE TAB 325 MG (65 MG ELEMENTAL FE) SCH MG: 325 (65 FE) TAB at 17:49

## 2017-08-10 RX ADMIN — IBUPROFEN SCH MG: 800 TABLET, FILM COATED ORAL at 22:30

## 2017-08-10 RX ADMIN — IBUPROFEN SCH MG: 800 TABLET, FILM COATED ORAL at 06:23

## 2017-08-10 RX ADMIN — DOCUSATE SODIUM SCH MG: 100 CAPSULE, LIQUID FILLED ORAL at 17:49

## 2017-08-10 RX ADMIN — LABETALOL HYDROCHLORIDE SCH MG: 200 TABLET, FILM COATED ORAL at 17:48

## 2017-08-10 RX ADMIN — FAMOTIDINE SCH MG: 20 TABLET, FILM COATED ORAL at 00:07

## 2017-08-10 RX ADMIN — ZOLPIDEM TARTRATE SCH MG: 5 TABLET ORAL at 22:30

## 2017-08-10 RX ADMIN — SENNOSIDES, DOCUSATE SODIUM SCH EACH: 50; 8.6 TABLET, FILM COATED ORAL at 09:10

## 2017-08-10 NOTE — PDOC PROGRESS REPORT
Subjective-OB


Subjective: 


Post Delivery Day:








27 year old.  Denies any needs at this time 


pt sitting in bed pumping breasts, feels good, pain under control, states baby 

is doing better, passing gas, voiding





Physical Exam (OB)


Vital Signs: 


 











Temp Pulse Resp BP Pulse Ox


 


 98.1 F   87   18   128/81 H  99 


 


 08/10/17 03:08  08/10/17 03:08  08/10/17 03:08  08/10/17 03:08  08/10/17 03:08














- PIH/Pre-Eclampsia


DTR's: 1 +


Clonus: Negative


Headache: Absent


Epigastric Pain: No


Visual Changes: No





- Lochia


Lochia Amount: Small 10-25 ml


Lochia Color: Rubra/Red





- Abdomen


Description: Soft


Hernia Present: No


Fundal Description: Firm


Fundal Height: u/u - u/2





Objective-Diagnostic


Laboratory: 


 





 08/10/17 06:53 





 08/10/17 06:53 





 











  08/09/17 08/10/17 08/10/17





  15:49 06:53 06:53


 


WBC  7.9  8.9 


 


RBC  3.90  3.58 L 


 


Hgb  11.1 L  10.4 L 


 


Hct  32.4 L  30.0 L 


 


MCV  83  84 


 


MCH  28.5  29.0 


 


MCHC  34.3  34.7 


 


RDW  14.9 H  14.7 H 


 


Plt Count  305  252 


 


Seg Neutrophils %  61.6  


 


Lymphocytes %  29.5  


 


Monocytes %  7.9  


 


Eosinophils %  0.5  


 


Basophils %  0.5  


 


Absolute Neutrophils  4.8  


 


Absolute Lymphocytes  2.3  


 


Absolute Monocytes  0.6  


 


Absolute Eosinophils  0.0  


 


Absolute Basophils  0.0  


 


Sodium    133.6 L


 


Potassium    4.8


 


Chloride    105


 


Carbon Dioxide    22


 


Anion Gap    7


 


BUN    18


 


Creatinine    0.63


 


Est GFR ( Amer)    > 60


 


Est GFR (Non-Af Amer)    > 60


 


Glucose    79


 


Uric Acid    6.5 H


 


Calcium    8.9


 


Total Bilirubin    0.3


 


AST    19


 


ALT    24


 


Alkaline Phosphatase    72


 


Total Protein    5.8 L


 


Albumin    2.7 L














Assessment and Plan(PN)





- Assessment and Plan


(1)  delivery (maternal condition)


Is this a current diagnosis for this admission?: Yes





(2) Limited prenatal care


Qualifiers: 


     Trimester: third trimester        Qualified Code(s): O09.33 - Supervision 

of pregnancy with insufficient  care, third trimester  


Is this a current diagnosis for this admission?: Yes





(3) Hepatitis C carrier


Is this a current diagnosis for this admission?: Yes





(4) Gestational hypertension


Qualifiers: 


     Trimester: second trimester        Qualified Code(s): O13.2 - Gestational [

pregnancy-induced] hypertension without significant proteinuria, second 

trimester  


Is this a current diagnosis for this admission?: Yes





(5) Postpartum hemorrhage


Qualifiers: 


     Postpartum hemorrhage type: delayed postpartum hemorrhage        Qualified 

Code(s): O72.2 - Delayed and secondary postpartum hemorrhage  


Is this a current diagnosis for this admission?: Yes








- Time Spent with Patient


Time with patient: Less than 15 minutes


Medications reviewed and adjusted accordingly: Yes





- Disposition


Anticipated Discharge: Home


Within: within 24 hours

## 2017-08-11 LAB
ALBUMIN SERPL-MCNC: 3.2 G/DL (ref 3.5–5)
ALP SERPL-CCNC: 74 U/L (ref 38–126)
ALT SERPL-CCNC: 30 U/L (ref 9–52)
ANION GAP SERPL CALC-SCNC: 8 MMOL/L (ref 5–19)
AST SERPL-CCNC: 20 U/L (ref 14–36)
BILIRUB DIRECT SERPL-MCNC: 0.3 MG/DL (ref 0–0.4)
BILIRUB SERPL-MCNC: 0.3 MG/DL (ref 0.2–1.3)
BUN SERPL-MCNC: 15 MG/DL (ref 7–20)
CALCIUM: 9.7 MG/DL (ref 8.4–10.2)
CHLORIDE SERPL-SCNC: 105 MMOL/L (ref 98–107)
CO2 SERPL-SCNC: 23 MMOL/L (ref 22–30)
CREAT SERPL-MCNC: 0.58 MG/DL (ref 0.52–1.25)
ERYTHROCYTE [DISTWIDTH] IN BLOOD BY AUTOMATED COUNT: 14.6 % (ref 11.5–14)
GLUCOSE SERPL-MCNC: 88 MG/DL (ref 75–110)
HCT VFR BLD CALC: 30.9 % (ref 36–47)
HGB BLD-MCNC: 10.4 G/DL (ref 12–15.5)
HGB HCT DIFFERENCE: 0.3
LDH1 SERPL-CCNC: 595 U/L (ref 313–618)
MCH RBC QN AUTO: 28.4 PG (ref 27–33.4)
MCHC RBC AUTO-ENTMCNC: 33.8 G/DL (ref 32–36)
MCV RBC AUTO: 84 FL (ref 80–97)
POTASSIUM SERPL-SCNC: 4.5 MMOL/L (ref 3.6–5)
PROT SERPL-MCNC: 6.4 G/DL (ref 6.3–8.2)
RBC # BLD AUTO: 3.67 10^6/UL (ref 3.72–5.28)
SODIUM SERPL-SCNC: 136.2 MMOL/L (ref 137–145)
URATE SERPL-MCNC: 6.2 MG/DL (ref 2.5–6.2)
WBC # BLD AUTO: 9.5 10^3/UL (ref 4–10.5)

## 2017-08-11 RX ADMIN — LABETALOL HYDROCHLORIDE SCH MG: 200 TABLET, FILM COATED ORAL at 10:25

## 2017-08-11 RX ADMIN — LABETALOL HYDROCHLORIDE SCH MG: 200 TABLET, FILM COATED ORAL at 01:41

## 2017-08-11 RX ADMIN — FERROUS SULFATE TAB 325 MG (65 MG ELEMENTAL FE) SCH MG: 325 (65 FE) TAB at 18:38

## 2017-08-11 RX ADMIN — IBUPROFEN SCH MG: 800 TABLET, FILM COATED ORAL at 14:34

## 2017-08-11 RX ADMIN — FAMOTIDINE SCH MG: 20 TABLET, FILM COATED ORAL at 22:17

## 2017-08-11 RX ADMIN — IBUPROFEN SCH MG: 800 TABLET, FILM COATED ORAL at 22:17

## 2017-08-11 RX ADMIN — DOCUSATE SODIUM SCH MG: 100 CAPSULE, LIQUID FILLED ORAL at 18:38

## 2017-08-11 RX ADMIN — NIFEDIPINE SCH MG: 30 TABLET, FILM COATED, EXTENDED RELEASE ORAL at 10:26

## 2017-08-11 RX ADMIN — DOCUSATE SODIUM SCH MG: 100 CAPSULE, LIQUID FILLED ORAL at 10:26

## 2017-08-11 RX ADMIN — FAMOTIDINE SCH MG: 20 TABLET, FILM COATED ORAL at 10:26

## 2017-08-11 RX ADMIN — SENNOSIDES, DOCUSATE SODIUM SCH EACH: 50; 8.6 TABLET, FILM COATED ORAL at 10:26

## 2017-08-11 RX ADMIN — FERROUS SULFATE TAB 325 MG (65 MG ELEMENTAL FE) SCH MG: 325 (65 FE) TAB at 10:26

## 2017-08-11 RX ADMIN — ZOLPIDEM TARTRATE SCH MG: 5 TABLET ORAL at 22:17

## 2017-08-11 RX ADMIN — PRENATAL W/O A VIT W/ FE FUMARATE-FA CAP 106.5-1 MG SCH CAP: 106.5 CAPSULE ORAL at 10:26

## 2017-08-11 RX ADMIN — IBUPROFEN SCH MG: 800 TABLET, FILM COATED ORAL at 05:21

## 2017-08-11 RX ADMIN — LABETALOL HYDROCHLORIDE SCH MG: 200 TABLET, FILM COATED ORAL at 18:38

## 2017-08-11 NOTE — PDOC PROGRESS REPORT
Subjective-OB


Subjective: 


Post Delivery Day:








27 year old.  Denies any needs at this time.  Pt doing well, pumping for 

 baby.  She denies headache now but reports vision changes earlier this 

morning.  BP meds adjusted again per Dr. Villegas.   Pt denies heavy bleeding. She 

reports voiding well, regular diet and has no other concerns.   








Physical Exam (OB)


Vital Signs: 


 











Temp Pulse Resp BP Pulse Ox


 


 98.2 F   87   15   142/94 H  100 


 


 17 08:12  17 10:24  17 08:12  17 10:24  17 08:12








 Intake & Output











 08/10/17 08/11/17 08/12/17





 06:59 06:59 06:59


 


Intake Total  500 


 


Balance  500 














- PIH/Pre-Eclampsia


DTR's: 2 +


Clonus: Negative


Headache: Absent


Epigastric Pain: No


Visual Changes: No





- Lochia


Lochia Amount: Scant < 10 ml


Lochia Color: Rubra/Red





- Abdomen


Description: Soft


Hernia Present: No


Fundal Description: Firm, Midline


Fundal Height: u/u - u/2





Objective-Diagnostic


Laboratory: 


 





 08/10/17 06:53 





 08/10/17 06:53 











Assessment and Plan(PN)





- Assessment and Plan


(1)  delivery (maternal condition)


Is this a current diagnosis for this admission?: Yes





(2) Gestational hypertension


Qualifiers: 


     Trimester: second trimester        Qualified Code(s): O13.2 - Gestational [

pregnancy-induced] hypertension without significant proteinuria, second 

trimester  


Is this a current diagnosis for this admission?: Yes





(3) Limited prenatal care


Qualifiers: 


     Trimester: third trimester        Qualified Code(s): O09.33 - Supervision 

of pregnancy with insufficient  care, third trimester  


Is this a current diagnosis for this admission?: Yes








- Time Spent with Patient


Time with patient: Less than 15 minutes


Medications reviewed and adjusted accordingly: Yes





- Disposition


Anticipated Discharge: Home


Within: within 24 hours, within 48 hours

## 2017-08-12 VITALS — DIASTOLIC BLOOD PRESSURE: 89 MMHG | SYSTOLIC BLOOD PRESSURE: 140 MMHG

## 2017-08-12 RX ADMIN — LABETALOL HYDROCHLORIDE SCH MG: 200 TABLET, FILM COATED ORAL at 01:53

## 2017-08-12 RX ADMIN — NIFEDIPINE SCH MG: 30 TABLET, FILM COATED, EXTENDED RELEASE ORAL at 11:09

## 2017-08-12 RX ADMIN — LABETALOL HYDROCHLORIDE SCH MG: 200 TABLET, FILM COATED ORAL at 11:07

## 2017-08-12 RX ADMIN — PRENATAL W/O A VIT W/ FE FUMARATE-FA CAP 106.5-1 MG SCH CAP: 106.5 CAPSULE ORAL at 11:11

## 2017-08-12 RX ADMIN — SENNOSIDES, DOCUSATE SODIUM SCH EACH: 50; 8.6 TABLET, FILM COATED ORAL at 11:11

## 2017-08-12 RX ADMIN — IBUPROFEN SCH MG: 800 TABLET, FILM COATED ORAL at 05:35

## 2017-08-12 RX ADMIN — FAMOTIDINE SCH MG: 20 TABLET, FILM COATED ORAL at 11:07

## 2017-08-12 RX ADMIN — DOCUSATE SODIUM SCH MG: 100 CAPSULE, LIQUID FILLED ORAL at 11:06

## 2017-08-12 RX ADMIN — FERROUS SULFATE TAB 325 MG (65 MG ELEMENTAL FE) SCH MG: 325 (65 FE) TAB at 11:07

## 2017-08-12 RX ADMIN — IBUPROFEN SCH MG: 800 TABLET, FILM COATED ORAL at 13:40

## 2017-08-12 NOTE — PDOC DISCHARGE SUMMARY
Final Diagnosis


Discharge Date: 17





- Final Diagnosis


(1)  delivery (maternal condition)


Is this a current diagnosis for this admission?: Yes





(2) Gestational hypertension


Is this a current diagnosis for this admission?: Yes





(3) Limited prenatal care


Is this a current diagnosis for this admission?: Yes








Discharge Data





- Discharge Medication


Home Medications: 








Prenatal Vit/Iron Fumarate/FA [Prenatal Tablet] 1 each PO DAILY 10/21/16 


Acetaminophen [Tylenol] 650 mg PO Q6 PRN 17 


Labetalol HCl 100 mg PO BID 17 








Reason(s) for Admission: Induction of Labor


Prenatal Procedures: NST, Management of Obstetric Complications


Intrapartum Procedure(s): Spontaneous Vaginal Delivery





- Diagnosis Test


Laboratory: 


 











Temp Pulse Resp BP Pulse Ox


 


 98.6 F   101 H  17   149/95 H  99 


 


 17 11:18  17 11:18  17 11:18  17 11:18  17 11:18








 











  17





  15:55 16:13 06:02


 


RBC   4.00  3.83


 


Hgb   11.5 L  11.0 L


 


Hct   33.9 L  31.9 L


 


Urine Opiates Screen  NEGATIVE  














  17





  06:04 11:04 07:21


 


RBC  3.47 L  3.81  3.86


 


Hgb  10.1 L  11.0 L  11.2 L


 


Hct  29.2 L  32.6 L  32.3 L


 


Urine Opiates Screen   














  17





  20:17 07:58 15:49


 


RBC  3.94  3.88  3.90


 


Hgb  11.3 L  11.1 L  11.1 L


 


Hct  33.2 L  32.3 L  32.4 L


 


Urine Opiates Screen   














  08/10/17 08/11/17





  06:53 11:27


 


RBC  3.58 L  3.67 L


 


Hgb  10.4 L  10.4 L


 


Hct  30.0 L  30.9 L


 


Urine Opiates Screen  














- Discharge information/Instructions


Discharge Activity: Balance Activity w/Rest, Pelvic Rest


Discharge Diet: Regular


Disposition: HOME, SELF-CARE


Follow up with: [facility]


in: 1, Weeks

## 2017-08-15 NOTE — DISCHARGE SUMMARY E
Discharge Summary



NAME: ADA DIANA

MRN:  [id number]        : 1990     AGE: 27Y

ADMITTED: 2017                 DISCHARGED: 07/10/2017



REASON FOR ADMISSION:

A 29-week intrauterine pregnancy with poor prenatal care and inability to

finish a 24-hour urine and for observation of her hypertension.



HISTORY AND PHYSICAL:

See Dr. Jones's history and physical for details.



HOSPITAL COURSE:

The patient is admitted to the hospital where she undergoes an obstetrical

ultrasound which shows that she is 29 weeks and 2 days' gestation.  She is

started on a 24-hour urine.  She is started on labetalol for blood

pressure control.  She has a couple episodes of tachycardia for which

Cardiology consulted; see their consult note.  They required no further

interventions during her hospital stay.  After completing her 24-hour

urine, which showed a total of 230 mg of protein, the patient was

discharged home.



DISCHARGE INSTRUCTIONS:

Discharge patient home.  Diet is low-salt diet.  Activity is as tolerated.

Follow-up interval is 1 week, [facility].



SPECIAL INSTRUCTIONS:

The patient was instructed to call MD if temperature greater than 100.5,

vaginal bleeding or loss of fluid or contractions occur, and she is also

given signs and symptoms of preeclampsia.



MEDICATIONS ON DISCHARGE:

Prenatal vitamins and labetalol.



DICTATING PHYSICIAN:  Aj Villegas DO





[id number]                  DT: 08/15/2017    1711

PHY#: [id number]            DD: 08/15/2017    1654

ID:   [id number]           JOB#: [id number]       ACCT: [id number]



cc:Aj Villegas D.O.

>

## 2018-01-29 ENCOUNTER — HOSPITAL ENCOUNTER (EMERGENCY)
Dept: HOSPITAL 62 - ER | Age: 28
Discharge: HOME | End: 2018-01-29
Payer: MEDICAID

## 2018-01-29 VITALS — SYSTOLIC BLOOD PRESSURE: 102 MMHG | DIASTOLIC BLOOD PRESSURE: 75 MMHG

## 2018-01-29 DIAGNOSIS — N12: Primary | ICD-10-CM

## 2018-01-29 DIAGNOSIS — R11.0: ICD-10-CM

## 2018-01-29 DIAGNOSIS — R00.0: ICD-10-CM

## 2018-01-29 DIAGNOSIS — R50.9: ICD-10-CM

## 2018-01-29 DIAGNOSIS — M79.1: ICD-10-CM

## 2018-01-29 LAB
ADD MANUAL DIFF: NO
ALBUMIN SERPL-MCNC: 4.8 G/DL (ref 3.5–5)
ALP SERPL-CCNC: 68 U/L (ref 38–126)
ALT SERPL-CCNC: 39 U/L (ref 9–52)
ANION GAP SERPL CALC-SCNC: 13 MMOL/L (ref 5–19)
APPEARANCE UR: (no result)
APTT PPP: YELLOW S
AST SERPL-CCNC: 32 U/L (ref 14–36)
BASOPHILS # BLD AUTO: 0 10^3/UL (ref 0–0.2)
BASOPHILS NFR BLD AUTO: 0.3 % (ref 0–2)
BILIRUB DIRECT SERPL-MCNC: 0.3 MG/DL (ref 0–0.4)
BILIRUB SERPL-MCNC: 0.7 MG/DL (ref 0.2–1.3)
BILIRUB UR QL STRIP: NEGATIVE
BUN SERPL-MCNC: 15 MG/DL (ref 7–20)
CALCIUM: 9.9 MG/DL (ref 8.4–10.2)
CHLORIDE SERPL-SCNC: 101 MMOL/L (ref 98–107)
CO2 SERPL-SCNC: 27 MMOL/L (ref 22–30)
EOSINOPHIL # BLD AUTO: 0 10^3/UL (ref 0–0.6)
EOSINOPHIL NFR BLD AUTO: 0.1 % (ref 0–6)
ERYTHROCYTE [DISTWIDTH] IN BLOOD BY AUTOMATED COUNT: 14.6 % (ref 11.5–14)
GLUCOSE SERPL-MCNC: 105 MG/DL (ref 75–110)
GLUCOSE UR STRIP-MCNC: NEGATIVE MG/DL
HCT VFR BLD CALC: 37.1 % (ref 36–47)
HGB BLD-MCNC: 12.6 G/DL (ref 12–15.5)
KETONES UR STRIP-MCNC: NEGATIVE MG/DL
LYMPHOCYTES # BLD AUTO: 1.4 10^3/UL (ref 0.5–4.7)
LYMPHOCYTES NFR BLD AUTO: 13.5 % (ref 13–45)
MCH RBC QN AUTO: 27.3 PG (ref 27–33.4)
MCHC RBC AUTO-ENTMCNC: 34 G/DL (ref 32–36)
MCV RBC AUTO: 80 FL (ref 80–97)
MONOCYTES # BLD AUTO: 1.3 10^3/UL (ref 0.1–1.4)
MONOCYTES NFR BLD AUTO: 12.7 % (ref 3–13)
NEUTROPHILS # BLD AUTO: 7.8 10^3/UL (ref 1.7–8.2)
NEUTS SEG NFR BLD AUTO: 73.4 % (ref 42–78)
NITRITE UR QL STRIP: POSITIVE
PH UR STRIP: 5 [PH] (ref 5–9)
PLATELET # BLD: 289 10^3/UL (ref 150–450)
POTASSIUM SERPL-SCNC: 4.9 MMOL/L (ref 3.6–5)
PROT SERPL-MCNC: 8.4 G/DL (ref 6.3–8.2)
PROT UR STRIP-MCNC: 100 MG/DL
RBC # BLD AUTO: 4.61 10^6/UL (ref 3.72–5.28)
SODIUM SERPL-SCNC: 141 MMOL/L (ref 137–145)
SP GR UR STRIP: 1.01
TOTAL CELLS COUNTED % (AUTO): 100 %
UROBILINOGEN UR-MCNC: NEGATIVE MG/DL (ref ?–2)
WBC # BLD AUTO: 10.6 10^3/UL (ref 4–10.5)

## 2018-01-29 PROCEDURE — 96361 HYDRATE IV INFUSION ADD-ON: CPT

## 2018-01-29 PROCEDURE — 99283 EMERGENCY DEPT VISIT LOW MDM: CPT

## 2018-01-29 PROCEDURE — 81001 URINALYSIS AUTO W/SCOPE: CPT

## 2018-01-29 PROCEDURE — 85025 COMPLETE CBC W/AUTO DIFF WBC: CPT

## 2018-01-29 PROCEDURE — 83605 ASSAY OF LACTIC ACID: CPT

## 2018-01-29 PROCEDURE — 87088 URINE BACTERIA CULTURE: CPT

## 2018-01-29 PROCEDURE — 80053 COMPREHEN METABOLIC PANEL: CPT

## 2018-01-29 PROCEDURE — 81025 URINE PREGNANCY TEST: CPT

## 2018-01-29 PROCEDURE — 87086 URINE CULTURE/COLONY COUNT: CPT

## 2018-01-29 PROCEDURE — 96365 THER/PROPH/DIAG IV INF INIT: CPT

## 2018-01-29 PROCEDURE — 96375 TX/PRO/DX INJ NEW DRUG ADDON: CPT

## 2018-01-29 PROCEDURE — 87186 SC STD MICRODIL/AGAR DIL: CPT

## 2018-01-29 PROCEDURE — 36415 COLL VENOUS BLD VENIPUNCTURE: CPT

## 2018-01-29 PROCEDURE — 87040 BLOOD CULTURE FOR BACTERIA: CPT

## 2018-01-29 NOTE — ER DOCUMENT REPORT
ED Medical Screen (RME)





- General


Chief Complaint: Urinary Problem


Stated Complaint: BACK PAIN


Time Seen by Provider: 01/29/18 13:47


Mode of Arrival: Ambulatory


Information source: Patient


TRAVEL OUTSIDE OF THE [place] IN LAST 30 DAYS: No





- HPI


Patient complains to provider of: dysuria


Onset: Other - pt with several week h/o dysuria and cloudy urine.  Started with 

fever today





- Related Data


Allergies/Adverse Reactions: 


 





No Known Allergies Allergy (Verified 01/29/18 13:26)


 











Past Medical History





- Social History


Frequency of alcohol use: Daily, bottle of liquor, such as vodka


Drug Abuse: None





- Past Medical History


Cardiac Medical History: Reports: Hx Hypertension - PIH


   Denies: Hx Congestive Heart Failure, Hx Heart Attack


Pulmonary Medical History: Reports: Hx Asthma - Childhood asthma


   Denies: Hx Bronchitis, Hx COPD, Hx Pneumonia, Hx Tuberculosis


Neurological Medical History: Denies: Hx Seizures


Renal/ Medical History: Denies: Hx End Stage Renal Disease, Hx Kidney Stones, 

Hx Peritoneal Dialysis


GI Medical History: Denies: Hx Cirrhosis, Hx Gastroesophageal Reflux Disease, 

Hx Ulcer


Musculoskeltal Medical History: Denies Hx Arthritis, Denies Hx Multiple 

Sclerosis


Psychiatric Medical History: 


   Denies: Hx Bipolar Disorder, Hx Depression, Hx Schizophrenia





- Immunizations


Immunizations up to date: Yes


Hx Diphtheria, Pertussis, Tetanus Vaccination: Yes





Physical Exam





- Vital signs


Vitals: 





 











Temp Pulse Resp BP Pulse Ox


 


 103.1 F H  141 H  18   136/101 H  97 


 


 01/29/18 13:30  01/29/18 13:30  01/29/18 13:30  01/29/18 13:30  01/29/18 13:30














Course





- Vital Signs


Vital signs: 





 











Temp Pulse Resp BP Pulse Ox


 


 103.1 F H  141 H  18   136/101 H  97 


 


 01/29/18 13:30  01/29/18 13:30  01/29/18 13:30  01/29/18 13:30  01/29/18 13:30

## 2018-01-29 NOTE — ER DOCUMENT REPORT
ED General





- General


Chief Complaint: Urinary Problem


Stated Complaint: BACK PAIN


Time Seen by Provider: 01/29/18 13:47


Mode of Arrival: Ambulatory


Information source: Patient


Notes: 





27-year-old female who has had UTI symptoms for the past 2 months presents with 

complaints of flank pain fevers body aches nausea over the past few days.


TRAVEL OUTSIDE OF THE [place] IN LAST 30 DAYS: No





- HPI


Onset: Other


Onset/Duration: Persistent


Quality of pain: Sharp


Severity: Moderate


Pain Level: 3


Associated symptoms: Body/muscle aches, Fever, Nausea


Exacerbated by: Denies


Relieved by: Denies


Similar symptoms previously: No


Recently seen / treated by doctor: No





- Related Data


Allergies/Adverse Reactions: 


 





No Known Allergies Allergy (Verified 01/29/18 13:26)


 











Past Medical History





- General


Information source: Patient





- Social History


Smoking Status: Never Smoker


Cigarette use (# per day): No


Chew tobacco use (# tins/day): No


Smoking Education Provided: No


Frequency of alcohol use: Daily, bottle of liquor, such as vodka


Drug Abuse: None


Family History: Reviewed & Not Pertinent


Patient has suicidal ideation: No


Patient has homicidal ideation: No





- Past Medical History


Cardiac Medical History: Reports: Hx Hypertension - PIH


   Denies: Hx Congestive Heart Failure, Hx Heart Attack


Pulmonary Medical History: Reports: Hx Asthma - Childhood asthma


   Denies: Hx Bronchitis, Hx COPD, Hx Pneumonia, Hx Tuberculosis


Neurological Medical History: Denies: Hx Seizures


Renal/ Medical History: Denies: Hx End Stage Renal Disease, Hx Kidney Stones, 

Hx Peritoneal Dialysis


GI Medical History: Denies: Hx Cirrhosis, Hx Gastroesophageal Reflux Disease, 

Hx Ulcer


Musculoskeltal Medical History: Denies Hx Arthritis, Denies Hx Multiple 

Sclerosis


Psychiatric Medical History: 


   Denies: Hx Bipolar Disorder, Hx Depression, Hx Schizophrenia





- Immunizations


Immunizations up to date: Yes


Hx Diphtheria, Pertussis, Tetanus Vaccination: Yes





Review of Systems





- Review of Systems


Notes: 





REVIEW OF SYSTEMS:


CONSTITUTIONAL : Admits to fevers chills


EENT:   Denies eye, ear, throat, or mouth pain or symptoms.  Denies nasal or 

sinus congestion or discharge.  Denies throat, tongue, or mouth swelling or 

difficulty swallowing.


CARDIOVASCULAR:  Denies chest pain.  Denies palpitations or racing or irregular 

heart beat.  Denies ankle edema.


RESPIRATORY:  Denies cough, cold, or chest congestion.  Denies shortness of 

breath, difficulty breathing, or wheezing.


GASTROINTESTINAL: Admits to suprapubic tenderness


GENITOURINARY:  Denies difficulty urinating, painful urination, burning, 

frequency, blood in urine, or discharge.


FEMALE  GENITOURINARY:  Denies vaginal bleeding, heavy or abnormal periods, 

irregular periods.  Denies vaginal discharge or odor. 


MUSCULOSKELETAL: Admits to flank pain


SKIN:   Denies rash, lesions or sores.


HEMATOLOGIC :   Denies easy bruising or bleeding.


LYMPHATIC:  Denies swollen, enlarged glands.


NEUROLOGICAL:  Denies confusion or altered mental status.  Denies passing out 

or loss of consciousness.  Denies dizziness or lightheadedness.  Denies 

headache.  Denies weakness or paralysis or loss of use of either side.  Denies 

problems with gait or speech.  Denies sensory loss, numbness, or tingling.  

Denies seizures.


PSYCHIATRIC:  Denies anxiety or stress.  Denies depression, suicidal ideation, 

or homicidal ideation.





ALL OTHER SYSTEMS REVIEWED AND NEGATIVE.











PHYSICAL EXAMINATION:





GENERAL: Overall well-appearing but noted to be febrile 103.





HEAD: Atraumatic, normocephalic.





EYES: Pupils equal round and reactive to light, extraocular movements intact, 

conjunctiva are normal.





ENT: Nares patent, oropharynx clear without exudates.  Moist mucous membranes.





NECK: Normal range of motion, supple without lymphadenopathy





LUNGS: Breath sounds clear to auscultation bilaterally and equal.  No wheezes 

rales or rhonchi.





HEART: Tachycardic in the 140s





ABDOMEN: Soft, tender in the suprapubic bilateral flanks





Female : deferred





Musculoskeletal: Normal range of motion, no pitting or edema.  No cyanosis.





NEUROLOGICAL: Cranial nerves grossly intact.  Normal speech, normal gait.  

Normal sensory, motor exams





PSYCH: Normal mood, normal affect.





SKIN: Warm, Dry, normal turgor, no rashes or lesions noted.

















Dictation was performed using Dragon voice recognition software





Physical Exam





- Vital signs


Vitals: 


 











Temp Pulse Resp BP Pulse Ox


 


 103.1 F H  141 H  18   136/101 H  97 


 


 01/29/18 13:30  01/29/18 13:30  01/29/18 13:30  01/29/18 13:30  01/29/18 13:30














Course





- Re-evaluation


Re-evalutation: 





01/29/18 15:59


Patient is noted to have obvious pyelonephritis, she will be given further IV 

fluids antibiotics


01/29/18 17:12


On reevaluation patient's heart rate has improved significantly, down to 103 

she states she feels better.  I will continue hydrating and will discharge home 

with extremely close follow-up patient has been sure that she cannot wait this 

long to be seen next time








After performing a Medical Screening Examination, I estimate there is LOW risk 

for ACUTE APPENDICITIS, BOWEL OBSTRUCTION, ACUTE CHOLECYSTITIS, PERFORATED 

DIVERTICULITIS, INCARCERATED HERNIA, PANCREATITIS, PELVIC INFLAMMATORY DISEASE, 

PERFORATED ULCER, ECTOPIC PREGNANCY, or TUBO-OVARIAN ABSCESS, thus I consider 

the discharge disposition reasonable. Also, there is no evidence or peritonitis

, sepsis, or toxicity. I have reevaluated this patient multiple times and no 

significant life threatening changes are noted. The patient and I have 

discussed the diagnosis and risks, and we agree with discharging home with 

close follow-up with the understanding that symptoms and presentations can 

change. We also discussed returning to the Emergency Department immediately if 

new or worsening symptoms occur. We have discussed the symptoms which are most 

concerning (e.g., bloody stool, fever, changing or worsening pain, vomiting) 

that necessitate immediate return.





- Vital Signs


Vital signs: 


 











Temp Pulse Resp BP Pulse Ox


 


 103 F H  145 H  18   138/78 H  96 


 


 01/29/18 15:59  01/29/18 15:59  01/29/18 16:44  01/29/18 15:59  01/29/18 16:44














- Laboratory


Result Diagrams: 


 01/29/18 14:15





 01/29/18 14:15


Laboratory results interpreted by me: 


 











  01/29/18 01/29/18 01/29/18





  13:40 14:15 14:15


 


WBC   10.6 H 


 


RDW   14.6 H 


 


Total Protein    8.4 H


 


Urine Protein  100 H  


 


Urine Blood  SMALL H  


 


Urine Nitrite  POSITIVE H  


 


Ur Leukocyte Esterase  LARGE H  














Discharge





- Discharge


Clinical Impression: 


 Pyelonephritis, Tachycardia





Fever


Qualifiers:


 Fever type: unspecified Qualified Code(s): R50.9 - Fever, unspecified





Condition: Stable


Disposition: HOME, SELF-CARE


Instructions:  Pyelonephritis ([facility])


Additional Instructions: 


Follow up with your physician tomorrow for further care or return to the ED 

IMMEDIATELY if symptoms worsen or new concerns occur. If you cannot afford to 

follow up with your primary care physician a list of low cost clinics have been 

provided at the end of your discharge papers as well.


Prescriptions: 


Ciprofloxacin HCl [Cipro 500 mg Tablet] 500 mg PO BID #20 tablet

## 2018-06-07 ENCOUNTER — HOSPITAL ENCOUNTER (EMERGENCY)
Dept: HOSPITAL 62 - ER | Age: 28
Discharge: HOME | End: 2018-06-07
Payer: MEDICAID

## 2018-06-07 VITALS — SYSTOLIC BLOOD PRESSURE: 151 MMHG | DIASTOLIC BLOOD PRESSURE: 99 MMHG

## 2018-06-07 DIAGNOSIS — R59.9: ICD-10-CM

## 2018-06-07 DIAGNOSIS — H60.501: ICD-10-CM

## 2018-06-07 DIAGNOSIS — Z87.891: ICD-10-CM

## 2018-06-07 DIAGNOSIS — H92.01: ICD-10-CM

## 2018-06-07 DIAGNOSIS — Z3A.10: ICD-10-CM

## 2018-06-07 DIAGNOSIS — O26.891: Primary | ICD-10-CM

## 2018-06-07 DIAGNOSIS — O16.1: ICD-10-CM

## 2018-06-07 DIAGNOSIS — H92.10: ICD-10-CM

## 2018-06-07 DIAGNOSIS — R68.84: ICD-10-CM

## 2018-06-07 PROCEDURE — 99282 EMERGENCY DEPT VISIT SF MDM: CPT

## 2018-06-07 NOTE — ER DOCUMENT REPORT
ED ENT





- General


Chief Complaint: Ear Pain


Stated Complaint: EAR/JAW PAIN


Time Seen by Provider: 06/07/18 09:06


Mode of Arrival: Ambulatory


Information source: Patient


Notes: 





38-year-old female presents to ED for complaint of right ear pain with swollen 

anterior lymph nodes for 3 days.  She states that is actually making her jaw 

hurt but she does not have any toothaches.  She is about 10 weeks pregnant with 

her third child.  She states she has had preeclampsia with the first 2.


TRAVEL OUTSIDE OF THE [place] IN LAST 30 DAYS: No





- HPI


Patient complains to provider of: Ear problem


Onset: Other


Onset/Duration: Gradual - 3 days


Quality of pain: Sharp, Stabbing


Severity: Moderate


Pain Level: 4


Context: Recent Illness


Location of pain: Ears - Right ear


Associated symptoms: Ear pain, Ear drainage, Swollen glands


Similar symptoms previously: Yes


Recently seen / treated by doctor: Yes





- Related Data


Allergies/Adverse Reactions: 


 





No Known Allergies Allergy (Verified 06/07/18 08:54)


 











Past Medical History





- General


Information source: Patient





- Social History


Smoking Status: Former Smoker


Cigarette use (# per day): No


Chew tobacco use (# tins/day): No


Smoking Education Provided: No


Frequency of alcohol use: Rare


Drug Abuse: None


Occupation: Dietitian


Lives with: Family


Family History: Reviewed & Not Pertinent


Patient has suicidal ideation: No


Patient has homicidal ideation: No





- Past Medical History


Cardiac Medical History: Reports: Hx Hypertension - PIH


Pulmonary Medical History: Reports: Hx Asthma - Childhood asthma


EENT Medical History: Reports: Ears


Neurological Medical History: Reports: None


Endocrine Medical History: Reports: None


Renal/ Medical History: Reports: Other - Preeclampsia with first 2 babies


Malignancy Medical History: Reports: None


Musculoskeltal Medical History: Reports Hx Musculoskeletal Trauma


Skin Medical History: Reports None


Traumatic Medical History: Reports: Hx Fractures - Left forearm


Infectious Medical History: Reports: None


Past Surgical History: Reports: Other - Cyst removed





- Immunizations


Immunizations up to date: Yes


Hx Diphtheria, Pertussis, Tetanus Vaccination: Yes





Review of Systems





- Review of Systems


Constitutional: No symptoms reported


EENT: Ear pain, Other - Jaw pain and lymph nodes swollen due to her pain


Cardiovascular: No symptoms reported


Respiratory: No symptoms reported


Gastrointestinal: No symptoms reported


Genitourinary: No symptoms reported


Female Genitourinary: No symptoms reported


Musculoskeletal: No symptoms reported


Skin: No symptoms reported


Hematologic/Lymphatic: No symptoms reported


Neurological/Psychological: No symptoms reported


-: Yes All other systems reviewed and negative





Physical Exam





- Vital signs


Vitals: 


 











Temp Pulse Resp BP Pulse Ox


 


 99.3 F   101 H  16   150/110 H  99 


 


 06/07/18 08:57  06/07/18 08:57  06/07/18 08:57  06/07/18 08:57  06/07/18 08:57











Interpretation: Normal, Hypertensive, Tachycardic





- General


General appearance: Appears well, Alert





- HEENT


Head: Normocephalic, Atraumatic


Eyes: Normal


Pupils: PERRL


Ears: Normal


External canal: Erythema, Swollen, Other - Right ear drainage in the ear


Tympanic membrane: Normal


Sinus: Normal


Nasal: Normal


Mouth/Lips: Normal


Mucous membranes: Normal


Pharynx: Normal


Neck: Normal, Anterior cervical chain





- Respiratory


Respiratory status: No respiratory distress


Chest status: Nontender


Breath sounds: Normal


Chest palpation: Normal





- Cardiovascular


Rhythm: Regular


Heart sounds: Normal auscultation


Murmur: No





- Abdominal


Inspection: Normal


Distension: No distension


Bowel sounds: Normal


Tenderness: Nontender


Organomegaly: No organomegaly





- Back


Back: Normal, Nontender





- Extremities


General upper extremity: Normal inspection, Nontender, Normal color, Normal ROM

, Normal temperature


General lower extremity: Normal inspection, Nontender, Normal color, Normal ROM

, Normal temperature, Normal weight bearing.  No: Elaine's sign





- Neurological


Neuro grossly intact: Yes


Cognition: Normal


Orientation: AAOx4


Lance Coma Scale Eye Opening: Spontaneous


Lance Coma Scale Verbal: Oriented


[place] Coma Scale Motor: Obeys Commands


[place] Coma Scale Total: 15


Speech: Normal


Motor strength normal: LUE, RUE, LLE, RLE


Sensory: Normal





- Psychological


Associated symptoms: Normal affect, Normal mood





- Skin


Skin Temperature: Warm


Skin Moisture: Dry


Skin Color: Normal





Course





- Re-evaluation


Re-evalutation: 





06/07/18 09:27


Patient was treated with Cipro otic eardrops and discharged home.  Patient was 

instructed to follow-up with her OB/GYN as her blood pressure is elevated.  

Patient will be instructed on use of eardrops.  She has been given a Cipro otic 

drops in the emergency room and a prescription for another so she can use them 

for 7 days.  Patient to follow-up with ENT





- Vital Signs


Vital signs: 


 











Temp Pulse Resp BP Pulse Ox


 


 99.3 F   97   16   151/99 H  99 


 


 06/07/18 08:57  06/07/18 09:21  06/07/18 08:57  06/07/18 09:21  06/07/18 08:57














Discharge





- Discharge


Clinical Impression: 


Right otitis externa


Qualifiers:


 Otitis externa type: unspecified type Chronicity: acute Qualified Code(s): 

H60.501 - Unspecified acute noninfective otitis externa, right ear





HTN (hypertension)


Qualifiers:


 Hypertension type: unspecified Qualified Code(s): I10 - Essential (primary) 

hypertension





Condition: Stable


Disposition: HOME, SELF-CARE


Additional Instructions: 


OTITIS EXTERNA:





     You have otitis externa -- an infection of the outer ear canal. This can 

be very painful.  It's sometimes called "swimmer's ear," because it often 

occurs after prolonged water exposure.  Many things, such as earwax and dirt in 

the ear, can contribute to it.


     The usual treatment is antibiotic/antiinflammatory ear drops. Occasionally

, a wick will be placed in the ear to draw in the medicine. If the infection is 

severe, an oral antibiotic may be prescribed.  Pain medication is often needed.

  Avoid getting water in the ear.


     Outer ear infections often take longer to heal than you might expect.  

Some tenderness and ache in the ear may persist for about two weeks.


     See your physician if you fail to improve as expected.  Call the doctor at 

once if you develop fever, increasing swelling (particularly if it makes your 

ear "poke out"), severe headache, stiff neck, or decreased hearing.





Your blood pressure is very elevated for being pregnant since you have already 

had a history of preeclampsia please follow-up with your OB/GYN today by 

telephone and schedule a follow-up appointment








USE OF EAR DROPS:


     Your ear drops won't do much good if they don't get all the way in. To 

help the ear drops penetrate all the way to the ear drum, use the following 

technique.  If you encounter problems of any kind, notify the physician.


     (1) Lay your head sideways on a pillow.


     (2) Place the dropper tip just barely inside the ear canal, almost 

touching the bottom side of the canal. The liquid is tolerated better on the 

bottom of the canal.


     (3) Squeeze out the appropriate amount of medicine, and remove the dropper.


     (4) Grab the back of the ear (just behind the ear canal) between your 

index finger and thumb.


     (5) Tug up, then let the ear drop back.  Repeat several times. This pumps 

the medicine down.


     (6) Wait five minutes, then place a cotton ball in the ear canal to catch 

and hold the medicine.





CIPROFLOXACIN:


     You have been given an antibacterial agent, ciprofloxacin (Cipro).  This 

medicine is not related to the penicillins, sulfas, cephalosporins, or 

tetracyclines.  It is often given to patients who are allergic to these drugs.  

It has been chosen for you either because other drugs are not appropriate, or 

because of the nature of your problem.


     Cipro should not be taken with antacids, as these can decrease its 

effectiveness.  It can be taken without regard to meals.  CIPRO SHOULD NOT BE 

TAKEN BY CHILDREN, NURSING WOMEN, OR PREGNANT WOMEN.


     Although Cipro is usually well-tolerated, common side effects can include 

nausea and diarrhea.  Contact your doctor if you experience any unusual 

symptoms while on this medication, such as joint pain or swelling, shortness of 

breath, wheezing, faintness, or hives.








USE OF ACETAMINOPHEN (Tylenol):


     Acetaminophen may be taken for pain relief or fever control. It's much 

safer than aspirin, offering a wider range of "safe" dosages.  It is safe 

during pregnancy.  Some brand names are Tylenol, Panadol, Datril, Anacin 3, 

Tempra, and Liquiprin. Acetaminophen can be repeated every four hours.  The 

following are maximum recommended dosages:





WEIGHT         Dose             Drops                  Elixir        Chewable(

80mg)


(LBS.)                            drprs=droppers    tsp=teaspoon


6                 40 mg            0.4 ml (1/2)


6-11            80 mg            0.8 ml (full)              tsp               

   1       tab


12-16         120 mg           1 1/2 drprs             3/4  tsp               1 

1/2  tabs


17-23         160 mg             2  drprs             1    tsp                 

  2       tabs


24-30         240 mg             3  drprs             1 1/2 tsp                

3       tabs


30-35         320 mg                                       2    tsp            

       4       tabs


36-41         360 mg                                       2 1/4   tsp         

     4 1/2 tabs


42-47         400 mg                                       2 1/2   tsp         

     5      tabs


48-53         480 mg                                       3    tsp            

       6      tabs


54-59         520 mg                                       3  1/4  tsp         

     6 1/2 tabs


60-64         560 mg                                       3  1/2  tsp         

     7      tabs 


65-70         600 mg                                       3  3/4  tsp         

     7 1/2 tabs


71-76         640 mg                                       4   tsp             

      8      tabs


77-82         720 mg                                       4 1/2   tsp         

    9      tabs


83-88         800 mg                                       5   tsp             

    10      tabs





>89 pounds or adults          650 mg to 900 mg





Acetaminophen can be repeated every four hours.  Maximum dose not to exceed 

4000 mg a day.





   These maximum recommended dosages are slightly higher than the dosages 

written on the product container, but these dosages are very safe and below the 

toxic dosage for acetaminophen.











FOLLOW-UP CARE:


If you have been referred to a physician for follow-up care, call the physician

s office for an appointment as you were instructed or within the next two days.

  If you experience worsening or a significant change in your symptoms, notify 

the physician immediately or return to the Emergency Department at any time for 

re-evaluation.


Prescriptions: 


Ciprofloxacin HCl/Hc [Cipro HC Otic Suspension] 4 drop RT_EAR BID 7 Days  bottle


Forms:  Elevated Blood Pressure, Return to Work


Referrals: 


JUDITH ASH DO [ASSOCIATE] - Follow up as needed

## 2018-07-22 ENCOUNTER — HOSPITAL ENCOUNTER (EMERGENCY)
Dept: HOSPITAL 62 - ER | Age: 28
LOS: 1 days | Discharge: HOME | End: 2018-07-23
Payer: MEDICAID

## 2018-07-22 DIAGNOSIS — O03.9: Primary | ICD-10-CM

## 2018-07-22 PROCEDURE — 99284 EMERGENCY DEPT VISIT MOD MDM: CPT

## 2018-07-22 PROCEDURE — 76801 OB US < 14 WKS SINGLE FETUS: CPT

## 2018-07-22 PROCEDURE — 81001 URINALYSIS AUTO W/SCOPE: CPT

## 2018-07-22 PROCEDURE — 86900 BLOOD TYPING SEROLOGIC ABO: CPT

## 2018-07-22 PROCEDURE — 36415 COLL VENOUS BLD VENIPUNCTURE: CPT

## 2018-07-22 PROCEDURE — 86901 BLOOD TYPING SEROLOGIC RH(D): CPT

## 2018-07-22 PROCEDURE — 84702 CHORIONIC GONADOTROPIN TEST: CPT

## 2018-07-22 PROCEDURE — 85025 COMPLETE CBC W/AUTO DIFF WBC: CPT

## 2018-07-23 VITALS — SYSTOLIC BLOOD PRESSURE: 112 MMHG | DIASTOLIC BLOOD PRESSURE: 82 MMHG

## 2018-07-23 LAB
ADD MANUAL DIFF: NO
APPEARANCE UR: (no result)
APTT PPP: (no result) S
BASOPHILS # BLD AUTO: 0.1 10^3/UL (ref 0–0.2)
BASOPHILS NFR BLD AUTO: 0.7 % (ref 0–2)
BILIRUB UR QL STRIP: NEGATIVE
EOSINOPHIL # BLD AUTO: 0.2 10^3/UL (ref 0–0.6)
EOSINOPHIL NFR BLD AUTO: 2.1 % (ref 0–6)
ERYTHROCYTE [DISTWIDTH] IN BLOOD BY AUTOMATED COUNT: 15 % (ref 11.5–14)
GLUCOSE UR STRIP-MCNC: NEGATIVE MG/DL
HCT VFR BLD CALC: 38 % (ref 36–47)
HGB BLD-MCNC: 12.8 G/DL (ref 12–15.5)
KETONES UR STRIP-MCNC: NEGATIVE MG/DL
LYMPHOCYTES # BLD AUTO: 3.1 10^3/UL (ref 0.5–4.7)
LYMPHOCYTES NFR BLD AUTO: 38.1 % (ref 13–45)
MCH RBC QN AUTO: 28 PG (ref 27–33.4)
MCHC RBC AUTO-ENTMCNC: 33.6 G/DL (ref 32–36)
MCV RBC AUTO: 83 FL (ref 80–97)
MONOCYTES # BLD AUTO: 0.7 10^3/UL (ref 0.1–1.4)
MONOCYTES NFR BLD AUTO: 8.4 % (ref 3–13)
NEUTROPHILS # BLD AUTO: 4.1 10^3/UL (ref 1.7–8.2)
NEUTS SEG NFR BLD AUTO: 50.7 % (ref 42–78)
NITRITE UR QL STRIP: NEGATIVE
PH UR STRIP: 6 [PH] (ref 5–9)
PLATELET # BLD: 298 10^3/UL (ref 150–450)
PROT UR STRIP-MCNC: 100 MG/DL
RBC # BLD AUTO: 4.56 10^6/UL (ref 3.72–5.28)
SP GR UR STRIP: 1.01
TOTAL CELLS COUNTED % (AUTO): 100 %
UROBILINOGEN UR-MCNC: NEGATIVE MG/DL (ref ?–2)
WBC # BLD AUTO: 8.1 10^3/UL (ref 4–10.5)

## 2018-07-23 NOTE — RADIOLOGY REPORT (SQ)
Ultrasound OB first trimester on 2018 at 3:01 AM



CLINICAL INDICATION: Pregnant, vaginal bleeding



COMPARISON: None this pregnancy



FINDINGS: Multiple sonographic images are obtained throughout the

pelvis by transabdominal and transvaginal approach, both

transverse and sagittal images are obtained. Uterus measures

approximately 12.2 x 7.2 x 7.9 cm. There is any elongated oval

fluid collection likely representing gestational sac extending

into the endometrium in the lower uterine segment and cervix. No

yolk sac or fetal pole is noted. The patient's beta hCG is

reportedly over 5000 and an intrauterine pregnancy should have

been identified. The appearance is most consistent with a

spontaneous  in progress. Neither ovary is visualized. No

adnexal mass or fluid collection is noted. No free fluid is

noted.



IMPRESSION: Findings most consistent with a spontaneous 

in progress. Recommend clinical follow-up.

## 2018-07-23 NOTE — ER DOCUMENT REPORT
ED Medical Screen (RME)





- General


Chief Complaint: Vag Bleeding, +preg <12wks


Stated Complaint: VAGINAL BLEEDING


Time Seen by Provider: 18 00:46


TRAVEL OUTSIDE OF THE [place] IN LAST 30 DAYS: No





- HPI


Patient complains to provider of: Pregnant with vaginal bleeding


Notes: 





18 00:46


Patient is a 28-year-old female who is , presenting to the emergency room 

with vaginal bleeding and pelvic cramping, she reports her last menstrual 

period was approximately 3 months ago, she had a positive home pregnancy but 

has not been seen by a physician during this pregnancy 


18 00:47


RAPID MEDICAL EVALUATION DISCLOSURE


I have seen this patient as part of a Rapid Medical Evaluation and, if 

applicable, placed any initially appropriate orders. The patient will be seen 

and fully evaluated, including a full history and physical exam, by a provider (

in Main ED or Fast Track) when a room becomes available.








- Related Data


Allergies/Adverse Reactions: 


 





No Known Allergies Allergy (Verified 18 08:54)


 











Past Medical History





- Past Medical History


Cardiac Medical History: Reports: Hx Hypertension - PIH


   Denies: Hx Congestive Heart Failure, Hx Heart Attack


Pulmonary Medical History: Reports: Hx Asthma - Childhood asthma


   Denies: Hx Bronchitis, Hx COPD, Hx Pneumonia, Hx Tuberculosis


Neurological Medical History: Denies: Hx Seizures


Renal/ Medical History: Denies: Hx End Stage Renal Disease, Hx Kidney Stones, 

Hx Peritoneal Dialysis


GI Medical History: Denies: Hx Cirrhosis, Hx Gastroesophageal Reflux Disease, 

Hx Ulcer


Musculoskeltal Medical History: Denies Hx Arthritis, Denies Hx Multiple 

Sclerosis, Reports Hx Musculoskeletal Trauma


Psychiatric Medical History: 


   Denies: Hx Bipolar Disorder, Hx Depression, Hx Schizophrenia


Traumatic Medical History: Reports: Hx Fractures - Left forearm


Past Surgical History: Reports: Other - Cyst removed





- Immunizations


Immunizations up to date: Yes


Hx Diphtheria, Pertussis, Tetanus Vaccination: Yes





Physical Exam





- Vital signs


Vitals: 





 











Temp Pulse Resp BP Pulse Ox


 


 99.1 F   108 H  16   156/110 H  99 


 


 18 23:56  18 23:56  18 23:56  18 23:56  18 23:56














Course





- Vital Signs


Vital signs: 





 











Temp Pulse Resp BP Pulse Ox


 


 99.1 F   108 H  16   156/110 H  99 


 


 18 23:56  18 23:56  18 23:56  18 23:56  18 23:56














- Laboratory


Result Diagrams: 


 18 00:10

## 2018-07-23 NOTE — ER DOCUMENT REPORT
ED GI/





- General


Chief Complaint: Vag Bleeding, +preg <12wks


Stated Complaint: VAGINAL BLEEDING


Time Seen by Provider: 18 00:46


Notes: 


The patient is a 28-year-old female, , LMP possibly 3 months ago, presents 

with 1 day of pelvic cramping and vaginal bleeding with clots.  She took a 

positive pregnancy test at home, but has not seen a doctor for this pregnancy.  

She denies syncope, dysuria, hematuria, fevers, diarrhea or constipation.


TRAVEL OUTSIDE OF THE [place] IN LAST 30 DAYS: No





- Related Data


Allergies/Adverse Reactions: 


 





No Known Allergies Allergy (Verified 18 08:54)


 











Past Medical History





- Social History


Smoking Status: Never Smoker


Frequency of alcohol use: None


Drug Abuse: None


Family History: Reviewed & Not Pertinent


Patient has suicidal ideation: No


Patient has homicidal ideation: No





- Past Medical History


Cardiac Medical History: Reports: Hx Hypertension - PIH


   Denies: Hx Congestive Heart Failure, Hx Heart Attack


Pulmonary Medical History: Reports: Hx Asthma - Childhood asthma


   Denies: Hx Bronchitis, Hx COPD, Hx Pneumonia, Hx Tuberculosis


Neurological Medical History: Denies: Hx Seizures


Renal/ Medical History: Denies: Hx End Stage Renal Disease, Hx Kidney Stones, 

Hx Peritoneal Dialysis


GI Medical History: Denies: Hx Cirrhosis, Hx Gastroesophageal Reflux Disease, 

Hx Ulcer


Musculoskeletal Medical History: Denies Hx Arthritis, Denies Hx Multiple 

Sclerosis, Reports Hx Musculoskeletal Trauma


Psychiatric Medical History: 


   Denies: Hx Bipolar Disorder, Hx Depression, Hx Schizophrenia


Traumatic Medical History: Reports: Hx Fractures - Left forearm


Past Surgical History: Reports: Other - Cyst removed





- Immunizations


Immunizations up to date: Yes


Hx Diphtheria, Pertussis, Tetanus Vaccination: Yes





Review of Systems





- Review of Systems


Notes: 


REVIEW OF SYSTEMS:


CONSTITUTIONAL: -fevers, -chills


EENT: -eye pain, -difficulty swallowing, -nasal congestion


CARDIOVASCULAR: -chest pain, -syncope.


RESPIRATORY: -cough, -SOB


GASTROINTESTINAL: +vaginal bleeding, +lower abdominal cramping, -nausea, -

vomiting, -diarrhea


GENITOURINARY: -dysuria, -hematuria


MUSCULOSKELETAL: -back pain, -neck pain


SKIN: -rash or skin lesions.


HEMATOLOGIC: -easy bruising or bleeding.


LYMPHATIC: -swollen, enlarged glands.


NEUROLOGICAL: -altered mental status or loss of consciousness, -headache, -

neurologic symptoms


PSYCHIATRIC: -anxiety, -depression.


ALL OTHER SYSTEMS REVIEWED AND NEGATIVE.





Physical Exam





- Vital signs


Vitals: 


 











Temp Pulse Resp BP Pulse Ox


 


 99.1 F   108 H  16   156/110 H  99 


 


 18 23:56  18 23:56  18 23:56  18 23:56  18 23:56














- Notes


Notes: 


PHYSICAL EXAMINATION:


GENERAL: Well-appearing, well-nourished and in no acute distress.


HEAD: Atraumatic, normocephalic.


EYES: Pupils equal round and reactive to light, extraocular movements intact, 

sclera anicteric, conjunctiva are normal.


ENT: nares patent, oropharynx clear without exudates.  Moist mucous membranes.


NECK: Normal range of motion, supple without lymphadenopathy


LUNGS: Breath sounds clear to auscultation bilaterally and equal.  No wheezes 

rales or rhonchi.


HEART: Regular rate and rhythm without murmurs


ABDOMEN: Soft, nontender, normoactive bowel sounds.  No guarding, no rebound.  

No masses appreciated.


: Large clot on bed, small amount of oozing from cervical os, non-tender 

uterus and adnexa


EXTREMITIES: Normal range of motion, no pitting or edema.  No cyanosis.


NEUROLOGICAL: Cranial nerves grossly intact.  Normal speech, normal gait.  

Normal sensory and motor exams.


PSYCH: Normal mood, normal affect.


SKIN: Warm, Dry, normal turgor, no rashes or lesions noted.





Course





- Re-evaluation


Re-evalutation: 


Patient with a spontaneous miscarriage on ultrasound and physical exam that 

appears completed due to the slowing of her bleeding.  Uterus was massaged and 

she appears well.  When she tried to stand up after returning from the bathroom

, she became to vagal down.  Provided her with IV fluids she feels much better.

  Her OB is at [facility]. Instructed her to follow-up this week to ensure that her 

miscarriage is completed.  Given very strict return precautions and she 

understands.





- Vital Signs


Vital signs: 


 











Temp Pulse Resp BP Pulse Ox


 


 99.1 F   108 H  16   156/110 H  99 


 


 18 23:56  18 23:56  18 23:56  18 23:56  18 23:56














- Laboratory


Result Diagrams: 


 18 00:10





Laboratory results interpreted by me: 


 











  18





  00:10 00:10 00:10


 


RDW  15.0 H  


 


Beta HCG, Quant   5887.90 H 


 


Urine Protein    100 H


 


Urine Blood    MODERATE H














- Diagnostic Test


Radiology reviewed: Image reviewed, Reports reviewed


Radiology results interpreted by me: 


Transvaginal US: Findings consistent with a spontaneous  in progress.  

Recommend clinical follow-up.





Discharge





- Discharge


Clinical Impression: 


 Spontaneous miscarriage





Condition: Stable


Additional Instructions: 


Miscarriage





     You have had a miscarriage (medically called a "spontaneous ").  

The miscarriage occurred because the fetus did not develop normally.  There is 

nothing you did to cause it, and nothing you could have done to prevent it.  

About one pregnancy in four ends in miscarriage.


     You should rest in bed for two or three days.  As there is some risk of 

infection of the uterus, you should not have intercourse for one week (or until 

okayed by your physician).


     You might not have a period for six to eight weeks.  You should not become 

pregnant again for at least three months -- the uterus requires time to get 

back to normal.


     Call the doctor or return for re-examination if there is heavy or 

persistent vaginal bleeding, fever, foul discharge, continued cramping pains, 

or abdominal pain.


Prescriptions: 


Hydrocodone/Acetaminophen [Norco 5-325 mg Tablet] 1 tab PO Q6H PRN #10 tablet


 PRN Reason: 


Ibuprofen [Motrin 600 Mg Tablet] 600 mg PO TID #15 tablet


Forms:  Elevated Blood Pressure


Referrals: 


LUIS EDWARDS MD [ACTIVE STAFF] - Follow up as needed

## 2019-07-02 ENCOUNTER — HOSPITAL ENCOUNTER (EMERGENCY)
Dept: HOSPITAL 62 - ER | Age: 29
LOS: 1 days | Discharge: HOME | End: 2019-07-03
Payer: SELF-PAY

## 2019-07-02 DIAGNOSIS — R11.0: ICD-10-CM

## 2019-07-02 DIAGNOSIS — H92.02: ICD-10-CM

## 2019-07-02 DIAGNOSIS — R19.7: ICD-10-CM

## 2019-07-02 DIAGNOSIS — H60.502: Primary | ICD-10-CM

## 2019-07-02 DIAGNOSIS — R00.0: ICD-10-CM

## 2019-07-02 DIAGNOSIS — R68.83: ICD-10-CM

## 2019-07-02 PROCEDURE — 96374 THER/PROPH/DIAG INJ IV PUSH: CPT

## 2019-07-02 PROCEDURE — 80048 BASIC METABOLIC PNL TOTAL CA: CPT

## 2019-07-02 PROCEDURE — 96361 HYDRATE IV INFUSION ADD-ON: CPT

## 2019-07-02 PROCEDURE — 81025 URINE PREGNANCY TEST: CPT

## 2019-07-02 PROCEDURE — 96375 TX/PRO/DX INJ NEW DRUG ADDON: CPT

## 2019-07-02 PROCEDURE — 85025 COMPLETE CBC W/AUTO DIFF WBC: CPT

## 2019-07-02 PROCEDURE — 99284 EMERGENCY DEPT VISIT MOD MDM: CPT

## 2019-07-02 PROCEDURE — 81001 URINALYSIS AUTO W/SCOPE: CPT

## 2019-07-02 PROCEDURE — 36415 COLL VENOUS BLD VENIPUNCTURE: CPT

## 2019-07-03 VITALS — DIASTOLIC BLOOD PRESSURE: 81 MMHG | SYSTOLIC BLOOD PRESSURE: 121 MMHG

## 2019-07-03 LAB
ADD MANUAL DIFF: NO
ANION GAP SERPL CALC-SCNC: 10 MMOL/L (ref 5–19)
APPEARANCE UR: (no result)
APTT PPP: YELLOW S
BASOPHILS # BLD AUTO: 0.1 10^3/UL (ref 0–0.2)
BASOPHILS NFR BLD AUTO: 0.9 % (ref 0–2)
BILIRUB UR QL STRIP: NEGATIVE
BUN SERPL-MCNC: 12 MG/DL (ref 7–20)
CALCIUM: 9.5 MG/DL (ref 8.4–10.2)
CHLORIDE SERPL-SCNC: 102 MMOL/L (ref 98–107)
CO2 SERPL-SCNC: 27 MMOL/L (ref 22–30)
EOSINOPHIL # BLD AUTO: 0.1 10^3/UL (ref 0–0.6)
EOSINOPHIL NFR BLD AUTO: 1 % (ref 0–6)
ERYTHROCYTE [DISTWIDTH] IN BLOOD BY AUTOMATED COUNT: 15.5 % (ref 11.5–14)
GLUCOSE SERPL-MCNC: 114 MG/DL (ref 75–110)
GLUCOSE UR STRIP-MCNC: NEGATIVE MG/DL
HCT VFR BLD CALC: 35.4 % (ref 36–47)
HGB BLD-MCNC: 11.8 G/DL (ref 12–15.5)
KETONES UR STRIP-MCNC: NEGATIVE MG/DL
LYMPHOCYTES # BLD AUTO: 2.8 10^3/UL (ref 0.5–4.7)
LYMPHOCYTES NFR BLD AUTO: 24.7 % (ref 13–45)
MCH RBC QN AUTO: 27 PG (ref 27–33.4)
MCHC RBC AUTO-ENTMCNC: 33.4 G/DL (ref 32–36)
MCV RBC AUTO: 81 FL (ref 80–97)
MONOCYTES # BLD AUTO: 1 10^3/UL (ref 0.1–1.4)
MONOCYTES NFR BLD AUTO: 9 % (ref 3–13)
NEUTROPHILS # BLD AUTO: 7.4 10^3/UL (ref 1.7–8.2)
NEUTS SEG NFR BLD AUTO: 64.4 % (ref 42–78)
NITRITE UR QL STRIP: NEGATIVE
PH UR STRIP: 5 [PH] (ref 5–9)
PLATELET # BLD: 251 10^3/UL (ref 150–450)
POTASSIUM SERPL-SCNC: 4 MMOL/L (ref 3.6–5)
PROT UR STRIP-MCNC: 30 MG/DL
RBC # BLD AUTO: 4.38 10^6/UL (ref 3.72–5.28)
SODIUM SERPL-SCNC: 138.8 MMOL/L (ref 137–145)
SP GR UR STRIP: 1.02
TOTAL CELLS COUNTED % (AUTO): 100 %
UROBILINOGEN UR-MCNC: NEGATIVE MG/DL (ref ?–2)
WBC # BLD AUTO: 11.5 10^3/UL (ref 4–10.5)

## 2019-07-03 NOTE — ER DOCUMENT REPORT
Doctor's Note


Notes: 





I personally and independently obtained patient history and examined the patient

in conjunction with the APC and agree with the assessment, treatment plan and 

disposition of the patient as recorded by the APC, and have reviewed the APC's 

note.





HISTORY OF PRESENT ILLNESS:


Patient is a 29-year-old female that presents to the emergency department for 

chief complaint of left ear pain.





ROS:


Constitutional: Negative for fever.


Cardiovascular: Negative for chest pain.


Respiratory: Negative for shortness of breath.


Gastrointestinal: Negative for vomiting or abdominal pain 


Musculoskeletal: Negative for arm, leg or back pain


Skin: Negative for rash.


Neurological: Negative for weakness or numbness.





Other than noted above, the 12 point review of systems was reviewed with the 

patient and were negative, all pertinent findings are included in the HPI.








PHYSICAL EXAMINATION:





Vital signs reviewed, nursing noted reviewed. 





GENERAL: Well-appearing, well-nourished and in no acute distress.





HEAD: Atraumatic, normocephalic.





EYES: Eyes appear normal, conjunctiva are normal.





ENT: nares patent, Moist mucous membranes.  Left external auditory canal, is 

edematous, there is a small window we can see the TM and it does appear normal.





NECK: Normal range of motion, supple without lymphadenopathy





LUNGS: No respiratory distress





PSYCH: Normal mood, normal affect.





SKIN: Warm, Dry, normal turgor, no rashes or lesions noted on exposed skin





MEDICAL DECISION MAKING:





Patient appeared well, after treatment, we did place a wick in the left external

auditory canal, patient tolerated well, Ciprodex was applied.  Patient 

discharged home with Ciprodex, advised to use for 7 days.





Please review detail APC documentation. 





*Note is created using voice recognition software and may contain spelling, 

syntax or grammatical errors.  








Laboratory











  07/03/19 07/03/19 07/03/19





  00:24 00:24 00:24


 


WBC  11.5 H  


 


RBC  4.38  


 


Hgb  11.8 L  


 


Hct  35.4 L  


 


MCV  81  


 


MCH  27.0  


 


MCHC  33.4  


 


RDW  15.5 H  


 


Plt Count  251  


 


Seg Neutrophils %  64.4  


 


Lymphocytes %  24.7  


 


Monocytes %  9.0  


 


Eosinophils %  1.0  


 


Basophils %  0.9  


 


Absolute Neutrophils  7.4  


 


Absolute Lymphocytes  2.8  


 


Absolute Monocytes  1.0  


 


Absolute Eosinophils  0.1  


 


Absolute Basophils  0.1  


 


Sodium   138.8 


 


Potassium   4.0 


 


Chloride   102 


 


Carbon Dioxide   27 


 


Anion Gap   10 


 


BUN   12 


 


Creatinine   0.53 


 


Est GFR ( Amer)   > 60 


 


Est GFR (Non-Af Amer)   > 60 


 


Glucose   114 H 


 


Calcium   9.5 


 


Urine Color    YELLOW


 


Urine Appearance    SLIGHTLY-CLOUDY


 


Urine pH    5.0


 


Ur Specific Gravity    1.023


 


Urine Protein    30 H


 


Urine Glucose (UA)    NEGATIVE


 


Urine Ketones    NEGATIVE


 


Urine Blood    NEGATIVE


 


Urine Nitrite    NEGATIVE


 


Urine Bilirubin    NEGATIVE


 


Urine Urobilinogen    NEGATIVE


 


Ur Leukocyte Esterase    NEGATIVE


 


Urine WBC (Auto)    1


 


Urine RBC (Auto)    1


 


Squamous Epi Cells Auto    8


 


Urine Mucus (Auto)    OCC


 


Urine Ascorbic Acid    NEGATIVE


 


Urine HCG, Qual    NEGATIVE

## 2019-07-03 NOTE — ER DOCUMENT REPORT
ED ENT





- General


Chief Complaint: Dizziness


Stated Complaint: EAR PAIN


Time Seen by Provider: 07/03/19 01:57


Notes: 





Patient is a 29-year-old female with a history of asthma who presents to the 

emergency department with a chief complaint of left ear pain.  Patient states 

that she developed left ear pain 3 days ago.  Patient states she has had chills 

but denies fever.  Patient states she does have a history of ear infections and 

that this feels the same.  Patient has had nausea with 2 episodes of diarrhea 

within the past 24 hours.  Patient denies sore throat or neck pain.  Denies 

drainage to the ear.  Denies cough.  Denies difficulty swallowing.  Patient 

states that it is difficult to hear out of the left ear.


TRAVEL OUTSIDE OF THE [place] IN LAST 30 DAYS: No





- Related Data


Allergies/Adverse Reactions: 


                                        





No Known Allergies Allergy (Verified 06/07/18 08:54)


   











Past Medical History





- General


Information source: Patient





- Social History


Smoking Status: Unknown if Ever Smoked


Cigarette use (# per day): No


Chew tobacco use (# tins/day): No


Frequency of alcohol use: None


Drug Abuse: None


Lives with: Spouse/Significant other


Family History: Reviewed & Not Pertinent


Patient has suicidal ideation: No


Patient has homicidal ideation: No





- Past Medical History


Cardiac Medical History: Reports: Hx Hypertension - PIH


   Denies: Hx Congestive Heart Failure, Hx Heart Attack


Pulmonary Medical History: Reports: Hx Asthma - Childhood asthma


   Denies: Hx Bronchitis, Hx COPD, Hx Pneumonia, Hx Tuberculosis


EENT Medical History: Reports: None


Neurological Medical History: Reports: None.  Denies: Hx Seizures


Endocrine Medical History: Reports: None


Renal/ Medical History: Reports: None.  Denies: Hx End Stage Renal Disease, Hx

Kidney Stones, Hx Peritoneal Dialysis


Malignancy Medical History: Reports: None


GI Medical History: Reports: None.  Denies: Hx Cirrhosis, Hx Gastroesophageal 

Reflux Disease, Hx Ulcer


Musculoskeletal Medical History: Denies Hx Arthritis, Denies Hx Multiple 

Sclerosis, Reports Hx Musculoskeletal Trauma


Skin Medical History: Reports None


Psychiatric Medical History: Reports: None


   Denies: Hx Bipolar Disorder, Hx Depression, Hx Schizophrenia


Traumatic Medical History: Reports: Hx Fractures - Left forearm


Infectious Medical History: Reports: None


Surgical Hx: Negative


Past Surgical History: Reports: Other - Cyst removed





- Immunizations


Immunizations up to date: Yes


Hx Diphtheria, Pertussis, Tetanus Vaccination: Yes





Review of Systems





- Review of Systems


Constitutional: No symptoms reported


EENT: See HPI


Cardiovascular: No symptoms reported


Respiratory: No symptoms reported


Gastrointestinal: See HPI


Genitourinary: No symptoms reported


Female Genitourinary: No symptoms reported


Musculoskeletal: No symptoms reported


Skin: No symptoms reported


Hematologic/Lymphatic: No symptoms reported


Neurological/Psychological: No symptoms reported





Physical Exam





- Vital signs


Vitals: 


                                        











Temp Pulse Resp BP Pulse Ox


 


 99.5 F   133 H  22 H  152/106 H  98 


 


 07/02/19 23:50  07/02/19 23:50  07/02/19 23:50  07/02/19 23:50  07/02/19 23:50











Interpretation: Hypertensive, Tachycardic





- Notes


Notes: 





GENERAL: Well-appearing, well-nourished and in no acute distress.


HEAD: Atraumatic, normocephalic.


EYES: Pupils equal round and reactive to light, extraocular movements intact, 

sclera anicteric, conjunctiva are normal.


ENT: Right TM normal and able to visualize all landmarks, Left TM unable to 

visualize due to edema in the external auditory canal- patient does have a small

amount of clear drainage from the ear, tenderness noted to the external pinna 

and tragus, nares patent, oropharynx clear without exudates.  Moist mucous 

membranes.


NECK: Normal range of motion, supple without lymphadenopathy or JVD.  


LUNGS: Breath sounds clear to auscultation bilaterally and equal.  No wheezes 

rales or rhonchi.


HEART: Regular rate and rhythm without murmurs, rubs or gallops.


ABDOMEN: Soft, nontender, normoactive bowel sounds.  No guarding, no rebound.  

No masses appreciated.


BACK: No cervical, thoracic, lumbar midline tenderness.  No saddle anesthesia, 

normal distal neurovascular exam.


GENITOURINARY: Deferred.


EXTREMITIES: Normal range of motion, no pitting or edema.  No clubbing or 

cyanosis.


NEUROLOGICAL: Cranial nerves II through XII grossly intact.  Normal speech, 

normal gait.


PSYCH: Normal mood, normal affect.


SKIN: Warm, Dry, normal turgor, no rashes or lesions noted.





Course





- Re-evaluation


Re-evalutation: 





07/03/19 05:48


Upon initial exam patient is sitting upright in stretcher and reports left ear 

pain.  Patient is tachycardic on the monitor but is afebrile.  I will obtain 

basic lab work as the patient reports diarrhea.  Will give patient a liter of 

fluids and medicate for pain.





Upon reevaluation Dr. KARON Hirsch brought into the room to assist with the placement

of an ear wick in for evaluation of the left ear.  Left ear wick was placed as 

documented by physician and Ciprodex drops administered.  Tolerated well.  She 

states that after receiving Toradol she is now able to hear out of her left ear 

and it feels less swollen.








- Vital Signs


Vital signs: 


                                        











Temp Pulse Resp BP Pulse Ox


 


 98.4 F   133 H  23 H  121/81   95 


 


 07/03/19 04:30  07/02/19 23:50  07/03/19 04:30  07/03/19 04:30  07/03/19 04:30














- Laboratory


Result Diagrams: 


                                 07/03/19 00:24





                                 07/03/19 00:24


Laboratory results interpreted by me: 


                                        











  07/03/19 07/03/19 07/03/19





  00:24 00:24 00:24


 


WBC  11.5 H  


 


Hgb  11.8 L  


 


Hct  35.4 L  


 


RDW  15.5 H  


 


Glucose   114 H 


 


Urine Protein    30 H














Discharge





- Discharge


Clinical Impression: 


Otitis externa


Qualifiers:


 Otitis externa type: unspecified type Chronicity: acute Laterality: left 

Qualified Code(s): H60.502 - Unspecified acute noninfective otitis externa, left

ear





Condition: Stable


Disposition: HOME, SELF-CARE


Additional Instructions: 


Today you were seen in the emergency department for left ear pain.  You do have 

an ear infection of the outer ear canal.  We did place an ear wick in your ear 

which helps keep the medicine in the ear canal.  Please use the Ciprodex drops 

as prescribed which is 4 drops in the left ear twice a day for 7 days.  Please 

return to the emergency department for neck pain, fever, increase swelling the 

left ear or left face, difficulty breathing or any other concerning signs or 

symptoms.














Using Ear Drops with a Wick





     A wick has been placed in your ear. This keeps the medicine in constant 

contact with the ear canal. You'll need to put fresh medicine into the wick. 

Follow these instructions. If you encounter problems of any kind, notify the 

physician.


     (1) Lay your head sideways on a pillow.


     (2) Place the dropper tip so it's almost touching the wick.


     (3) Squeeze out the appropriate amount of medicine.


     (4) Wait a minute for the medicine to soak in before sitting up.


     (5) Wipe away any extra medicine from your ear.











Otitis Externa





     You have otitis externa -- an infection of the outer ear canal. This can be

very painful.  It's sometimes called "swimmer's ear," because it often occurs 

after prolonged water exposure.  Many things, such as earwax and dirt in the 

ear, can contribute to it.


     The usual treatment is antibiotic/antiinflammatory ear drops. Occasionally,

a wick will be placed in the ear to draw in the medicine. If the infection is 

severe, an oral antibiotic may be prescribed.  Pain medication is often needed. 

Avoid getting water in the ear.


     Outer ear infections often take longer to heal than you might expect.  Some

tenderness and ache in the ear may persist for about two weeks.


     See your physician if you fail to improve as expected.  Call the doctor at 

once if you develop fever, increasing swelling (particularly if it makes your 

ear "poke out"), severe headache, stiff neck, or decreased hearing.